# Patient Record
Sex: FEMALE | Race: WHITE | NOT HISPANIC OR LATINO | Employment: FULL TIME | URBAN - METROPOLITAN AREA
[De-identification: names, ages, dates, MRNs, and addresses within clinical notes are randomized per-mention and may not be internally consistent; named-entity substitution may affect disease eponyms.]

---

## 2018-01-03 ENCOUNTER — CONVERSION ENCOUNTER (OUTPATIENT)
Dept: INTERNAL MEDICINE | Facility: CLINIC | Age: 28
End: 2018-01-03

## 2018-01-03 ENCOUNTER — OFFICE VISIT CONVERTED (OUTPATIENT)
Dept: INTERNAL MEDICINE | Facility: CLINIC | Age: 28
End: 2018-01-03
Attending: INTERNAL MEDICINE

## 2018-03-07 ENCOUNTER — OFFICE VISIT CONVERTED (OUTPATIENT)
Dept: INTERNAL MEDICINE | Facility: CLINIC | Age: 28
End: 2018-03-07
Attending: INTERNAL MEDICINE

## 2018-04-13 ENCOUNTER — CONVERSION ENCOUNTER (OUTPATIENT)
Dept: INTERNAL MEDICINE | Facility: CLINIC | Age: 28
End: 2018-04-13

## 2018-04-13 ENCOUNTER — OFFICE VISIT CONVERTED (OUTPATIENT)
Dept: INTERNAL MEDICINE | Facility: CLINIC | Age: 28
End: 2018-04-13
Attending: INTERNAL MEDICINE

## 2018-05-25 ENCOUNTER — OFFICE VISIT CONVERTED (OUTPATIENT)
Dept: INTERNAL MEDICINE | Facility: CLINIC | Age: 28
End: 2018-05-25
Attending: INTERNAL MEDICINE

## 2018-05-25 ENCOUNTER — CONVERSION ENCOUNTER (OUTPATIENT)
Dept: INTERNAL MEDICINE | Facility: CLINIC | Age: 28
End: 2018-05-25

## 2018-07-03 ENCOUNTER — OFFICE VISIT CONVERTED (OUTPATIENT)
Dept: INTERNAL MEDICINE | Facility: CLINIC | Age: 28
End: 2018-07-03
Attending: INTERNAL MEDICINE

## 2018-07-03 ENCOUNTER — CONVERSION ENCOUNTER (OUTPATIENT)
Dept: INTERNAL MEDICINE | Facility: CLINIC | Age: 28
End: 2018-07-03

## 2018-07-19 ENCOUNTER — OFFICE VISIT CONVERTED (OUTPATIENT)
Dept: INTERNAL MEDICINE | Facility: CLINIC | Age: 28
End: 2018-07-19
Attending: NURSE PRACTITIONER

## 2018-08-14 ENCOUNTER — CONVERSION ENCOUNTER (OUTPATIENT)
Dept: INTERNAL MEDICINE | Facility: CLINIC | Age: 28
End: 2018-08-14

## 2018-08-14 ENCOUNTER — OFFICE VISIT CONVERTED (OUTPATIENT)
Dept: INTERNAL MEDICINE | Facility: CLINIC | Age: 28
End: 2018-08-14
Attending: INTERNAL MEDICINE

## 2018-10-26 ENCOUNTER — OFFICE VISIT CONVERTED (OUTPATIENT)
Dept: INTERNAL MEDICINE | Facility: CLINIC | Age: 28
End: 2018-10-26
Attending: INTERNAL MEDICINE

## 2019-01-29 ENCOUNTER — HOSPITAL ENCOUNTER (OUTPATIENT)
Dept: OTHER | Facility: HOSPITAL | Age: 29
Discharge: HOME | End: 2019-01-29
Attending: INTERNAL MEDICINE

## 2019-01-29 ENCOUNTER — OFFICE VISIT CONVERTED (OUTPATIENT)
Dept: INTERNAL MEDICINE | Facility: CLINIC | Age: 29
End: 2019-01-29
Attending: INTERNAL MEDICINE

## 2019-01-29 ENCOUNTER — CONVERSION ENCOUNTER (OUTPATIENT)
Dept: INTERNAL MEDICINE | Facility: CLINIC | Age: 29
End: 2019-01-29

## 2019-01-29 LAB
ALBUMIN SERPL-MCNC: 4.2 G/DL (ref 3.5–5)
ALBUMIN/GLOB SERPL: 1.6 {RATIO} (ref 1.4–2.6)
ALP SERPL-CCNC: 65 U/L (ref 42–98)
ALT SERPL-CCNC: 16 U/L (ref 10–40)
ANION GAP SERPL CALC-SCNC: 16 MMOL/L (ref 8–19)
AST SERPL-CCNC: 17 U/L (ref 15–50)
BASOPHILS # BLD AUTO: 0.09 10*3/UL (ref 0–0.2)
BASOPHILS NFR BLD AUTO: 1.21 % (ref 0–3)
BILIRUB SERPL-MCNC: 0.27 MG/DL (ref 0.2–1.3)
BUN SERPL-MCNC: 14 MG/DL (ref 5–25)
BUN/CREAT SERPL: 13 {RATIO} (ref 6–20)
CALCIUM SERPL-MCNC: 8.8 MG/DL (ref 8.7–10.4)
CHLORIDE SERPL-SCNC: 105 MMOL/L (ref 99–111)
CHOLEST SERPL-MCNC: 161 MG/DL (ref 107–200)
CHOLEST/HDLC SERPL: 3.7 {RATIO} (ref 3–6)
CONV CO2: 22 MMOL/L (ref 22–32)
CONV TOTAL PROTEIN: 6.9 G/DL (ref 6.3–8.2)
CREAT UR-MCNC: 1.07 MG/DL (ref 0.5–0.9)
EOSINOPHIL # BLD AUTO: 0.09 10*3/UL (ref 0–0.7)
EOSINOPHIL # BLD AUTO: 1.16 % (ref 0–7)
ERYTHROCYTE [DISTWIDTH] IN BLOOD BY AUTOMATED COUNT: 12.4 % (ref 11.5–14.5)
GFR SERPLBLD BASED ON 1.73 SQ M-ARVRAT: >60 ML/MIN/{1.73_M2}
GLOBULIN UR ELPH-MCNC: 2.7 G/DL (ref 2–3.5)
GLUCOSE SERPL-MCNC: 90 MG/DL (ref 65–99)
HBA1C MFR BLD: 12.4 G/DL (ref 12–16)
HCT VFR BLD AUTO: 35.5 % (ref 37–47)
HDLC SERPL-MCNC: 44 MG/DL (ref 40–60)
LDLC SERPL CALC-MCNC: 100 MG/DL (ref 70–100)
LYMPHOCYTES # BLD AUTO: 2.83 10*3/UL (ref 1–5)
MCH RBC QN AUTO: 28.8 PG (ref 27–31)
MCHC RBC AUTO-ENTMCNC: 35 G/DL (ref 33–37)
MCV RBC AUTO: 82.4 FL (ref 81–99)
MONOCYTES # BLD AUTO: 0.54 10*3/UL (ref 0.2–1.2)
MONOCYTES NFR BLD AUTO: 7.35 % (ref 3–10)
NEUTROPHILS # BLD AUTO: 3.76 10*3/UL (ref 2–8)
NEUTROPHILS NFR BLD AUTO: 51.5 % (ref 30–85)
NRBC BLD AUTO-RTO: 0 % (ref 0–0.01)
OSMOLALITY SERPL CALC.SUM OF ELEC: 288 MOSM/KG (ref 273–304)
PLATELET # BLD AUTO: 292 10*3/UL (ref 130–400)
PMV BLD AUTO: 7 FL (ref 7.4–10.4)
POTASSIUM SERPL-SCNC: 4.4 MMOL/L (ref 3.5–5.3)
RBC # BLD AUTO: 4.3 10*6/UL (ref 4.2–5.4)
SODIUM SERPL-SCNC: 139 MMOL/L (ref 135–147)
TRIGL SERPL-MCNC: 87 MG/DL (ref 40–150)
TSH SERPL-ACNC: 0.73 M[IU]/L (ref 0.27–4.2)
VARIANT LYMPHS NFR BLD MANUAL: 38.8 % (ref 20–45)
VLDLC SERPL-MCNC: 17 MG/DL (ref 5–37)
WBC # BLD AUTO: 7.31 10*3/UL (ref 4.8–10.8)

## 2019-03-26 ENCOUNTER — OFFICE VISIT CONVERTED (OUTPATIENT)
Dept: INTERNAL MEDICINE | Facility: CLINIC | Age: 29
End: 2019-03-26
Attending: INTERNAL MEDICINE

## 2019-03-26 ENCOUNTER — HOSPITAL ENCOUNTER (OUTPATIENT)
Dept: OTHER | Facility: HOSPITAL | Age: 29
Discharge: HOME | End: 2019-03-26
Attending: INTERNAL MEDICINE

## 2019-03-28 LAB
CONV LAST MENSTURAL PERIOD: NORMAL
SPECIMEN SOURCE: NORMAL
SPECIMEN SOURCE: NORMAL
THIN PREP CVX: NORMAL

## 2019-05-07 ENCOUNTER — OFFICE VISIT CONVERTED (OUTPATIENT)
Dept: INTERNAL MEDICINE | Facility: CLINIC | Age: 29
End: 2019-05-07
Attending: INTERNAL MEDICINE

## 2019-07-18 ENCOUNTER — OFFICE VISIT CONVERTED (OUTPATIENT)
Dept: INTERNAL MEDICINE | Facility: CLINIC | Age: 29
End: 2019-07-18
Attending: INTERNAL MEDICINE

## 2019-09-03 ENCOUNTER — OFFICE VISIT CONVERTED (OUTPATIENT)
Dept: INTERNAL MEDICINE | Facility: CLINIC | Age: 29
End: 2019-09-03
Attending: INTERNAL MEDICINE

## 2019-10-18 ENCOUNTER — OFFICE VISIT CONVERTED (OUTPATIENT)
Dept: INTERNAL MEDICINE | Facility: CLINIC | Age: 29
End: 2019-10-18
Attending: INTERNAL MEDICINE

## 2020-01-16 ENCOUNTER — OFFICE VISIT CONVERTED (OUTPATIENT)
Dept: INTERNAL MEDICINE | Facility: CLINIC | Age: 30
End: 2020-01-16
Attending: INTERNAL MEDICINE

## 2020-01-24 ENCOUNTER — HOSPITAL ENCOUNTER (OUTPATIENT)
Dept: LAB | Facility: HOSPITAL | Age: 30
Discharge: HOME | End: 2020-01-24
Attending: INTERNAL MEDICINE

## 2020-01-24 LAB
ALBUMIN SERPL-MCNC: 4.5 G/DL (ref 3.5–5)
ALBUMIN/GLOB SERPL: 1.6 {RATIO} (ref 1.4–2.6)
ALP SERPL-CCNC: 62 U/L (ref 42–98)
ALT SERPL-CCNC: 16 U/L (ref 10–40)
ANION GAP SERPL CALC-SCNC: 21 MMOL/L (ref 8–19)
AST SERPL-CCNC: 17 U/L (ref 15–50)
BASOPHILS # BLD AUTO: 0.06 10*3/UL (ref 0–0.2)
BASOPHILS NFR BLD AUTO: 0.6 % (ref 0–3)
BILIRUB SERPL-MCNC: 0.17 MG/DL (ref 0.2–1.3)
BUN SERPL-MCNC: 17 MG/DL (ref 5–25)
BUN/CREAT SERPL: 16 {RATIO} (ref 6–20)
CALCIUM SERPL-MCNC: 9.5 MG/DL (ref 8.7–10.4)
CHLORIDE SERPL-SCNC: 106 MMOL/L (ref 99–111)
CONV ABS IMM GRAN: 0.03 10*3/UL (ref 0–0.2)
CONV CO2: 20 MMOL/L (ref 22–32)
CONV IMMATURE GRAN: 0.3 % (ref 0–1.8)
CONV TOTAL PROTEIN: 7.3 G/DL (ref 6.3–8.2)
CREAT UR-MCNC: 1.08 MG/DL (ref 0.5–0.9)
DEPRECATED RDW RBC AUTO: 42.2 FL (ref 36.4–46.3)
EOSINOPHIL # BLD AUTO: 0.09 10*3/UL (ref 0–0.7)
EOSINOPHIL # BLD AUTO: 0.9 % (ref 0–7)
ERYTHROCYTE [DISTWIDTH] IN BLOOD BY AUTOMATED COUNT: 13.7 % (ref 11.7–14.4)
GFR SERPLBLD BASED ON 1.73 SQ M-ARVRAT: >60 ML/MIN/{1.73_M2}
GLOBULIN UR ELPH-MCNC: 2.8 G/DL (ref 2–3.5)
GLUCOSE SERPL-MCNC: 95 MG/DL (ref 65–99)
HCT VFR BLD AUTO: 38.3 % (ref 37–47)
HGB BLD-MCNC: 12 G/DL (ref 12–16)
LYMPHOCYTES # BLD AUTO: 2.25 10*3/UL (ref 1–5)
LYMPHOCYTES NFR BLD AUTO: 23.5 % (ref 20–45)
MCH RBC QN AUTO: 26.4 PG (ref 27–31)
MCHC RBC AUTO-ENTMCNC: 31.3 G/DL (ref 33–37)
MCV RBC AUTO: 84.4 FL (ref 81–99)
MONOCYTES # BLD AUTO: 0.56 10*3/UL (ref 0.2–1.2)
MONOCYTES NFR BLD AUTO: 5.8 % (ref 3–10)
NEUTROPHILS # BLD AUTO: 6.6 10*3/UL (ref 2–8)
NEUTROPHILS NFR BLD AUTO: 68.9 % (ref 30–85)
NRBC CBCN: 0 % (ref 0–0.7)
OSMOLALITY SERPL CALC.SUM OF ELEC: 295 MOSM/KG (ref 273–304)
PLATELET # BLD AUTO: 321 10*3/UL (ref 130–400)
PMV BLD AUTO: 9.8 FL (ref 9.4–12.3)
POTASSIUM SERPL-SCNC: 4.5 MMOL/L (ref 3.5–5.3)
RBC # BLD AUTO: 4.54 10*6/UL (ref 4.2–5.4)
SODIUM SERPL-SCNC: 142 MMOL/L (ref 135–147)
T4 FREE SERPL-MCNC: 1.2 NG/DL (ref 0.9–1.8)
TSH SERPL-ACNC: 0.36 M[IU]/L (ref 0.27–4.2)
WBC # BLD AUTO: 9.59 10*3/UL (ref 4.8–10.8)

## 2020-01-29 LAB
CONV ANTI GALACTOSE ALPHA 1,3 IGE: 0.27 KU/L
IGE SERPL-ACNC: 4 K[IU]/ML (ref 0–24)

## 2020-03-02 ENCOUNTER — OFFICE VISIT CONVERTED (OUTPATIENT)
Dept: INTERNAL MEDICINE | Facility: CLINIC | Age: 30
End: 2020-03-02
Attending: INTERNAL MEDICINE

## 2020-03-02 ENCOUNTER — CONVERSION ENCOUNTER (OUTPATIENT)
Dept: INTERNAL MEDICINE | Facility: CLINIC | Age: 30
End: 2020-03-02

## 2020-03-13 ENCOUNTER — HOSPITAL ENCOUNTER (OUTPATIENT)
Dept: URGENT CARE | Facility: CLINIC | Age: 30
Discharge: HOME | End: 2020-03-13
Attending: FAMILY MEDICINE

## 2020-05-01 ENCOUNTER — TELEMEDICINE CONVERTED (OUTPATIENT)
Dept: INTERNAL MEDICINE | Facility: CLINIC | Age: 30
End: 2020-05-01
Attending: INTERNAL MEDICINE

## 2020-06-09 ENCOUNTER — TELEMEDICINE CONVERTED (OUTPATIENT)
Dept: INTERNAL MEDICINE | Facility: CLINIC | Age: 30
End: 2020-06-09
Attending: INTERNAL MEDICINE

## 2020-07-09 ENCOUNTER — TELEMEDICINE CONVERTED (OUTPATIENT)
Dept: INTERNAL MEDICINE | Facility: CLINIC | Age: 30
End: 2020-07-09
Attending: NURSE PRACTITIONER

## 2020-08-10 ENCOUNTER — TELEMEDICINE CONVERTED (OUTPATIENT)
Dept: INTERNAL MEDICINE | Facility: CLINIC | Age: 30
End: 2020-08-10
Attending: NURSE PRACTITIONER

## 2020-11-06 ENCOUNTER — TELEMEDICINE CONVERTED (OUTPATIENT)
Dept: INTERNAL MEDICINE | Facility: CLINIC | Age: 30
End: 2020-11-06
Attending: INTERNAL MEDICINE

## 2020-11-17 ENCOUNTER — OFFICE VISIT CONVERTED (OUTPATIENT)
Dept: PSYCHIATRY | Facility: CLINIC | Age: 30
End: 2020-11-17
Attending: STUDENT IN AN ORGANIZED HEALTH CARE EDUCATION/TRAINING PROGRAM

## 2020-12-17 ENCOUNTER — TELEMEDICINE CONVERTED (OUTPATIENT)
Dept: PSYCHIATRY | Facility: CLINIC | Age: 30
End: 2020-12-17
Attending: STUDENT IN AN ORGANIZED HEALTH CARE EDUCATION/TRAINING PROGRAM

## 2021-01-19 ENCOUNTER — TELEMEDICINE CONVERTED (OUTPATIENT)
Dept: PSYCHIATRY | Facility: CLINIC | Age: 31
End: 2021-01-19
Attending: STUDENT IN AN ORGANIZED HEALTH CARE EDUCATION/TRAINING PROGRAM

## 2021-02-17 ENCOUNTER — TELEMEDICINE CONVERTED (OUTPATIENT)
Dept: INTERNAL MEDICINE | Facility: CLINIC | Age: 31
End: 2021-02-17
Attending: INTERNAL MEDICINE

## 2021-02-25 ENCOUNTER — TELEMEDICINE CONVERTED (OUTPATIENT)
Dept: PSYCHIATRY | Facility: CLINIC | Age: 31
End: 2021-02-25
Attending: STUDENT IN AN ORGANIZED HEALTH CARE EDUCATION/TRAINING PROGRAM

## 2021-05-10 NOTE — H&P
"   History and Physical      Patient Name: Carina Sanchez   Patient ID: [id number]   Sex: Female   YOB: 1990    Primary Care Provider: Amber Byrne MD   Referring Provider: Amber Byrne MD    Visit Date: November 17, 2020    Provider: Mihaela Hull MD   Location: [facility]   Location Address: [address] [place] [address]  [id number]   Location Phone: [phone]          Chief Complaint     Pt presents with anxiety    \"I have been battling with depression since I can remember.\"       History Of Present Illness  Carina Sanchez is a 30 year old /White female who presents to the office today referred by Amber Byrne MD.   Chart 11/17: Seen November 6. Feels like she is on a roller coaster. On Trintellix 20 mg a day, highest dose. BMI is 44. March: Creatinine 1.08. Otherwise utd&wnl. No EKG or head imaging. PHQ 9 November 17 of 16, jaclyn 7 is 14, almost severe.   Patient reports the last year has been difficult. She states she has been battling depression \"since I can remember.\" Regarding depressive symptoms, endorses anhedonia, as she used to enjoy listening to music and watching TV. Also endorses sleeping in excess, especially on the weekends; poor energy, binge eating, and poor concentration. Regarding anxiety symptoms, patient endorses feeling on edge, uncontrolled worrying, irritability.   Patient is extremely organized and brought a notebook full of lists regarding such things as her triggers, medications, etc. She also brought along a journal.   Patient wonders if she has borderline personality disorder. She feels the description \"fits me exactly.\"   Patient has access to weapons, not all of them locked away. Psychiatric review of systems is positive for anxiety and depression, negative for yin and psychosis. She denies SI HI AVH. On Trintellix for about the last year. On 20 mg of this medication for the last 2 months.   Past Psychiatric " "History:  Began Psychiatric Treatment: For about the last 2 years   Dx: Depression and anxiety   Psychiatrist: Primary care provider   Therapist: Tried therapy once via Zoom, and did not like it. Some willingness to try again, if she can meet face-to-face with her therapist for the initial visit.   Admissions History: Denies   Medication Trials: Trintellix, Zoloft, Cymbalta, Lexapro, Concerta, Wellbutrin. None of these medications have helped.   Self-Harm: Patient used to cut her shoulders bilaterally in high school to \"feel something.\" Patient also reports it \"gave me control over something.\"   Suicide Attempts: Denies   Substance Abuse History:  Types: Smokes 6 cigarettes a day, no desire to quit. Vapes cannabis through 1 pad a month. Social alcohol. Denies all else, including illicit.   Social History:  Marital Status: Single   Employed: Yes   Employer: [employer]   Kids: Denies   House: Lives in her mom and stepwinston's house    Hx: Denies   Family History:  Suicide Attempts: Paternal grandfather shot himself, her mom's brother shot himself, and her maternal cousin shot himself 4 years ago. This latter is one of the reasons why the patient \"began her spiral.\"   Suicide Completions: All of the above were completed   Substance Use: Addiction is on both sides of the family   Psychiatric Conditions: \"A lot\" of psychiatry issues. States one of her family members is diagnosed with bipolar.   Developmental History:  Born: [place]   Siblings: Deferred   Childhood: No abuse. Raised by her grandparents until she was 7 years old, then her mother took over taking care of her.   High School: Completed   College: Some       Past Medical History  Disease Name Date Onset Notes   Allergic rhinitis --  --    Anxiety --  --    Arthritis --  --    Bipolar Disorder --  --    GERD (gastroesophageal reflux disease) 04/13/2018 well controlled cont current meds   HTN (hypertension) --  --    Moderate episode of recurrent major " depressive disorder 04/13/2018 Will increase lexapro to 10 mg. Will also increase wellbutrin to 150 mg BID, discussed risk of serotonin syndrome and signs and symptoms to look for with using lexapro and wellbutrin together. Discussed seeing a counselor to talk through her underlying social stressors, patient agrees to do so. Told patient that we will consider a referral to psychiatry at a later date if we are unable to get her depression under good control. Educated the patient on the importance of finding a career and goals that are beneficial to her mental health.          Past Surgical History  Injection of keloid; [place] Tooth Extraction         Medication List  Name Date Started Instructions   Allegra Allergy 180 mg oral tablet 11/06/2020 take 1 tablet (180 mg) by oral route once daily for 90 days   CeraVe topical cream 01/16/2020 apply to affected area(s) by topical route 2 times a day   Depo-Provera 150 mg/mL intramuscular suspension 09/08/2020 inject 1 milliliter (150 mg) by intramuscular route every 3 months   Flonase Allergy Relief 50 mcg/actuation nasal spray,suspension 11/06/2020 spray 1 - 2 sprays (50 - 100 mcg) in each nostril by intranasal route once daily as needed   lisinopril 10 mg oral tablet 11/06/2020 TAKE 1 TABLET BY MOUTH ONCE daily   naproxen 500 mg oral tablet 11/06/2020 take 1 tablet (500 mg) by oral route 2 times per day with food   omeprazole 40 mg oral capsule,delayed release(DR/EC) 11/06/2020 take 1 capsule (40 mg) by oral route once daily before a meal for 90 days   Tazorac 0.05 % topical gel 11/06/2020 apply to the affected area(s) by topical route once daily   Trintellix 20 mg oral tablet 11/06/2020 TAKE 1 TABLET BY MOUTH EVERY DAY AT the same time each day.         Allergy List  Allergen Name Date Reaction Notes   Keflex --  Hives --          Reproductive History  Menstrual   Last Menstrual Period: 02/07/2015   Pregnancy Summary   Total Pregnancies: 0 Full Term: 0 Premature: 0   Ab  "Induced: 0 Ab Spontaneous: 0 Ectopics: 0   Multiples: 0 Livin         Social History  Finding Status Start/Stop Quantity Notes   Alcohol Current some day --/-- --  --    Tobacco Former --/-- 1 PPD Hx 9 years of smoking         Immunizations  Name Date Admin   Influenza 10/18/2019   Influenza 2018         Review of Systems  · Constitutional  o Admits  o : fatigue  o Denies  o : night sweats  · Eyes  o Denies  o : double vision, blurred vision  · HENT  o Denies  o : vertigo, recent head injury  · Cardiovascular  o Denies  o : chest pain, irregular heart beats  · Respiratory  o Denies  o : shortness of breath, productive cough  · Gastrointestinal  o Denies  o : nausea, vomiting  · Genitourinary  o Denies  o : dysuria, urinary retention  · Integument  o Denies  o : hair growth change, new skin lesions  · Neurologic  o Denies  o : altered mental status, seizures  · Musculoskeletal  o Denies  o : joint swelling, limitation of motion  · Endocrine  o Denies  o : cold intolerance, heat intolerance  · Psychiatric  o Admits  o : anxiety, depression, yin  o Denies  o : post traumatic stress disorder, obsessive compulsive disorder, psychosis  · Allergic-Immunologic  o Denies  o : frequent illnesses      Vitals  Date Time BP Position Site L\R Cuff Size HR RR TEMP (F) WT  HT  BMI kg/m2 BSA m2 O2 Sat FR L/min FiO2        2020 02:46 /91 Sitting    102 - R 20 96.6 265lbs 4oz 5'  6\" 42.81 2.37 99 %  21%          Physical Examination  · Mental Status Exam  o Appearance  o : good eye contact, normal street clothes, groomed, wearing a mask  o Behavior  o : pleasant and cooperative  o Motor  o : no abnormal  o Speech  o : normal rhythm, rate, volume, tone, not hyperverbal, not pressured, normal prosidy  o Mood  o : \"I am broken\"  o Affect  o : Euthymic, then tearful at times  o Thought Content  o : negative suicidal ideations, negative homicidal ideations, negative obsessions  o Perceptions  o : negative auditory " hallucinations, negative visual hallucinations  o Thought Process  o : Circumstantial  o Insight/Judgement  o : fair/fair  o Cognition  o : grossly intact  o Attention  o : intact          Assessment  · Screening for depression     V79.0/Z13.31  · Tobacco abuse counseling       Tobacco abuse counseling     V65.42/Z71.6  · Depressive Disorder     311/F32.9  · Anxiety     300.02/F41.1       Presentation most consistent with cluster B personality disorder, very likely borderline.  Also depressive disorder unspecified, anxiety disorder unspecified.    Would augment vortioxetine with Abilify, low-dose.  Psychotherapy is the mainstay of treatment for personality disorders, and it is recommended; referral made.  Patient to return in 1 month.       Plan  · Orders  o ACO-18: Positive screen for clinical depression using a standardized tool and a follow-up plan documented () - V79.0/Z13.31 - 11/17/2020  o Smoking and tobacco cessation counseling visit for the asymptomatic patient; intermediate, greater than 3 minutes, up to 10 minutes [facility] ([id number]) - V65.42/Z71.6 - 11/17/2020  o PSYCHOTHERAPY CONSULTATION (TRANGYTH) - 300.02/F41.1, V79.0/Z13.31, 311/F32.9, V65.42/Z71.6 - 11/17/2020  o ACO-39: Current medications updated and reviewed (, 1159F) - - 11/17/2020  · Medications  o Abilify 2 mg oral tablet   SIG: take 1 tablet (2 mg) by oral route once daily for 30 days   DISP: (30) Tablet with 1 refills  Prescribed on 11/17/2020     o Medications have been Reconciled  o Transition of Care or Provider Policy  · Instructions  o Tobacco and smoking cessation counseling for more than 3 minutes was completed.  o Risk Assessment: Risk of self-harm acutely is moderate to high. Risk factors include depressive disorder, anxiety disorder, personality disorder, access to weapons, history of self-harm, family history of suicide attempts, some AODA. Protective factors include no present SI, no history of suicide attempts, healthcare  seeking, future orientation, willingness to engage in care, psychologically minded. Risk of self-harm chronically is also moderate to high, and could be further elevated in the event of treatment noncompliance and/or AODA.  o Safety: No acute safety concerns.  o Medications: Continue Trintellix 20 mg p.o. daily. START Abilify 2 mg p.o. daily to augment Trintellix and target depression, anxiety, decreased energy. Risks, benefits, alternatives discussed with patient including increased energy, exacerbation of irritability, akathisia, GI upset, orthostatic hypotension. After discussion of these risks and benefits, the patient voiced understanding and agreed to proceed.  o Therapy: Recommended; referral made. Patient desires to have at least the initial visit in person, and during the pandemic she may have difficulty finding a clinic that will accommodate this wish.  o Follow Up: 1 month.  · Disposition  o Follow Up in 1 month.  · Correspondence  o Behavioral Health Letter to Referring MD (Amber Byrne MD) - 11/17/2020            Electronically Signed by: Mihaela Hull MD -Author on November 17, 2020 05:59:09 PM

## 2021-05-13 NOTE — PROGRESS NOTES
"   Progress Note      Patient Name: Carina Sanchez   Patient ID: [id number]   Sex: Female   YOB: 1990    Primary Care Provider: Amber Byrne MD    Visit Date: July 9, 2020    Provider: MARNIE Del Real   Location: [facility]   Location Address: [address] [place] [address], [facility] [address]  [place], [place]  [id number]   Location Phone: [phone]          Chief Complaint  · \"I am following up on vyvanse\"      History Of Present Illness  Video Conferencing Visit  Carina Sanchez is a 30 year old /White female who is presenting for evaluation via video conferencing via Zoom. Verbal consent obtained before beginning visit.   The following staff were present during this visit: MARNIE Booth   Carina Sanchez is a 30 year old /White female who presents for evaluation and treatment of:      Informed patient that as visit is being performed as a telehealth visit there will be no opportunity to obtain vital signs or perform physical exam.  Due to this there is unfortunately a possibility that things may be missed that would typically be noticed during a traditionally visit.  Patient is aware of this possibility and agrees to proceed with telehealth.  Patient states there is no other person present for this phone call    Patient is doing a 1 month follow-up related to her new medication.  Patient was started on Vyvanse 40 mg.  Patient has been taking the 20 mg for the first month and is now ready to increase to the 40 mg as prescribed.  Patient states that she can tell an improvement in her concentration but thinks that the 40 mg will work better.    Denies palpitations or appetite concerns.  Has lost about 10lbs working on her diet and drinking more water. Feels she isn't eating out of boredom.    Patient voices no other concerns at this time.       Past Medical History  Disease Name Date Onset Notes   Allergic rhinitis --  --    Anxiety --  --    Arthritis --  --    Bipolar " Disorder --  --    GERD (gastroesophageal reflux disease) 04/13/2018 well controlled cont current meds   HTN (hypertension) --  --    Moderate episode of recurrent major depressive disorder 04/13/2018 Will increase lexapro to 10 mg. Will also increase wellbutrin to 150 mg BID, discussed risk of serotonin syndrome and signs and symptoms to look for with using lexapro and wellbutrin together. Discussed seeing a counselor to talk through her underlying social stressors, patient agrees to do so. Told patient that we will consider a referral to psychiatry at a later date if we are unable to get her depression under good control. Educated the patient on the importance of finding a career and goals that are beneficial to her mental health.          Past Surgical History  Procedure Name Date Notes   Injection of keloid 2000 Keloid removal both ears   [place] Tooth Extraction --  --          Medication List  Name Date Started Instructions   Allegra Allergy 180 mg oral tablet 10/18/2019 take 1 tablet (180 mg) by oral route once daily for 90 days   CeraVe topical cream 01/16/2020 apply to affected area(s) by topical route 2 times a day   Flonase Allergy Relief 50 mcg/actuation nasal spray,suspension 10/18/2019 spray 1 - 2 sprays (50 - 100 mcg) in each nostril by intranasal route once daily as needed   lisinopril 10 mg oral tablet 01/16/2020 take 1 tablet (10 mg) by oral route once daily for 90 days   medroxyprogesterone 150 mg/mL intramuscular syringe 06/08/2020 inject 1 milliliter (150 mg) by intramuscular route every 3 months   naproxen 500 mg oral tablet 10/18/2019 take 1 tablet (500 mg) by oral route 2 times per day with food   omeprazole 40 mg oral capsule,delayed release(DR/EC) 04/28/2020 take 1 capsule (40 mg) by oral route once daily before a meal for 90 days   Tazorac 0.05 % topical gel 01/16/2020 apply to the affected area(s) by topical route once daily   Trintellix 10 mg oral tablet 03/31/2020 take 1 tablet (10 mg)  by oral route once daily at the same time each day   Vyvanse 40 mg oral capsule 2020 take 1 capsule (40 mg) by oral route once daily in the morning for 30 days         Allergy List  Allergen Name Date Reaction Notes   Keflex --  Hives --        Allergies Reconciled  Reproductive History  Menstrual   Last Menstrual Period: 2015   Pregnancy Summary   Total Pregnancies: 0 Full Term: 0 Premature: 0   Ab Induced: 0 Ab Spontaneous: 0 Ectopics: 0   Multiples: 0 Livin         Social History  Finding Status Start/Stop Quantity Notes   Alcohol Current some day --/-- --  --    Tobacco Former --/-- 1 PPD Hx 9 years of smoking         Immunizations  NameDate Admin Mfg Trade Name Lot Number Route Inj VIS Given VIS Publication   [id number]/ [employer] Fluzone Quadrivalent [id number] IM RD 10/18/2019    Comments: pt tolerated well and left office in stable condition, DESIREE Norman   Gsotonvsq40/07/2018 [facility] Fluzone Quadrivalent [id number] IM RD 2018   Comments: tolerated well         Review of Systems  · Constitutional  o Admits  o : weight loss  o Denies  o : fever, fatigue, weight gain  · Cardiovascular  o Denies  o : lower extremity edema, claudication, chest pressure, palpitations  · Respiratory  o Denies  o : shortness of breath, wheezing, frequent cough, hemoptysis, dyspnea on exertion  · Gastrointestinal  o Denies  o : nausea, vomiting, diarrhea, constipation, abdominal pain  · Psychiatric  o Admits  o : decreased concentration, irritability  o Denies  o : anxiety, mood changes, memory changes, SI/HI      Physical Examination     Gen: well-nourished, no acute distress  HENT: atraumatic, normocephalic  Eyes: extraocular movements intact, no scleral icterus  Lung: breathing comfortably, no cough  Skin: no visible rash, no lesions  Neuro: grossly oriented to person, place, and time. no facial droop   Psych: normal mood and affect             Assessment  · Moderate episode of recurrent major depressive  disorder     296.32/F33.1  Will increase lexapro to 10 mg. Will also increase wellbutrin to 150 mg BID, discussed risk of serotonin syndrome and signs and symptoms to look for with using lexapro and wellbutrin together. Discussed seeing a counselor to talk through her underlying social stressors, patient agrees to do so. Told patient that we will consider a referral to psychiatry at a later date if we are unable to get her depression under good control. Educated the patient on the importance of finding a career and goals that are beneficial to her mental health.   · Anxiety     300.02/F41.1  · Concentration deficit     799.51/R41.840  Patient is doing well with Vyvanse 20 mg and plans to increase the 40 mg at this time. We will follow-up in 1 month.    Problems Reconciled  Plan  · Orders  o ACO-39: Current medications updated and reviewed () - - 07/09/2020  · Medications  o Medications have been Reconciled  o Transition of Care or Provider Policy  · Instructions  o Patient was educated/instructed on their diagnosis, treatment and medications prior to discharge from the clinic today.  · Disposition  o Call or Return if symptoms worsen or persist.  o Meds sent to pharmacy  o 1 month follow up            Electronically Signed by: Yakelin Mcdonnell, APRN -Author on July 9, 2020 12:38:00 PM

## 2021-05-13 NOTE — PROGRESS NOTES
"   Progress Note      Patient Name: Carina Sanchez   Patient ID: [id number]   Sex: Female   YOB: 1990    Primary Care Provider: Amber Byrne MD    Visit Date: June 9, 2020    Provider: Amber Byrne MD   Location: [facility]   Location Address: [address] [place] [address], [facility] [address]  [place], [place]  [id number]   Location Phone: [phone]          Chief Complaint  · \"following up on Concerta\"  · \"I want to talk to her about phentermine\"      History Of Present Illness  Video Conferencing Visit  Carina Sanchez is a 30 year old /White female who is presenting for evaluation via video conferencing via zoom. Verbal consent obtained before beginning visit.   The following staff were present during this visit: none   Carina Sanchez is a 30 year old /White female who presents for evaluation and treatment of:      doing well with anxiety  cont current meds  having some trouble with concentration    she didn't like the medicine we were using for concentration  states that she just didn't feel like herself on it    she is still frustrated with her weight  she would like to try phentermine for weight loss       Past Medical History  Disease Name Date Onset Notes   Allergic rhinitis --  --    Anxiety --  --    Arthritis --  --    Bipolar Disorder --  --    GERD (gastroesophageal reflux disease) 04/13/2018 well controlled cont current meds   HTN (hypertension) --  --    Moderate episode of recurrent major depressive disorder 04/13/2018 Will increase lexapro to 10 mg. Will also increase wellbutrin to 150 mg BID, discussed risk of serotonin syndrome and signs and symptoms to look for with using lexapro and wellbutrin together. Discussed seeing a counselor to talk through her underlying social stressors, patient agrees to do so. Told patient that we will consider a referral to psychiatry at a later date if we are unable to get her depression under good control. Educated the " patient on the importance of finding a career and goals that are beneficial to her mental health.          Past Surgical History  Procedure Name Date Notes   Injection of keloid 2000 Keloid removal both ears   [place] Tooth Extraction --  --          Medication List  Name Date Started Instructions   Allegra Allergy 180 mg oral tablet 10/18/2019 take 1 tablet (180 mg) by oral route once daily for 90 days   CeraVe topical cream 2020 apply to affected area(s) by topical route 2 times a day   Flonase Allergy Relief 50 mcg/actuation nasal spray,suspension 10/18/2019 spray 1 - 2 sprays (50 - 100 mcg) in each nostril by intranasal route once daily as needed   lisinopril 10 mg oral tablet 2020 take 1 tablet (10 mg) by oral route once daily for 90 days   medroxyprogesterone 150 mg/mL intramuscular syringe 2020 inject 1 milliliter (150 mg) by intramuscular route every 3 months   naproxen 500 mg oral tablet 10/18/2019 take 1 tablet (500 mg) by oral route 2 times per day with food   omeprazole 40 mg oral capsule,delayed release(DR/EC) 2020 take 1 capsule (40 mg) by oral route once daily before a meal for 90 days   Tazorac 0.05 % topical gel 2020 apply to the affected area(s) by topical route once daily   Trintellix 10 mg oral tablet 2020 take 1 tablet (10 mg) by oral route once daily at the same time each day         Allergy List  Allergen Name Date Reaction Notes   Keflex --  Hives --        Allergies Reconciled  Reproductive History  Menstrual   Last Menstrual Period: 2015   Pregnancy Summary   Total Pregnancies: 0 Full Term: 0 Premature: 0   Ab Induced: 0 Ab Spontaneous: 0 Ectopics: 0   Multiples: 0 Livin         Social History  Finding Status Start/Stop Quantity Notes   Alcohol Current some day --/-- --  --    Tobacco Former --/-- 1 PPD Hx 9 years of smoking         Immunizations  NameDate Admin Mfg Trade Name Lot Number Route Inj VIS Given VIS Publication   [id number]/  [facility] Fluzone Quadrivalent [id number] IM RD 10/18/2019    Comments: pt tolerated well and left office in stable condition, DESIREE Norman   [id number]/07/2018 [employer] Fluzone Quadrivalent [id number] IM RD 11/07/2018 08/19/2014   Comments: tolerated well         Review of Systems  · Constitutional  o Denies  o : fever, fatigue, weight loss, weight gain  · Cardiovascular  o Denies  o : lower extremity edema, claudication, chest pressure, palpitations  · Respiratory  o Denies  o : shortness of breath, wheezing, frequent cough, hemoptysis, dyspnea on exertion  · Gastrointestinal  o Denies  o : nausea, vomiting, diarrhea, constipation, abdominal pain      Physical Examination     Gen: well-nourished, no acute distress  HENT: atraumatic, normocephalic  Eyes: extraocular movements intact, no scleral icterus  Lung: breathing comfortably, no cough  Skin: no visible rash, no lesions  Neuro: grossly oriented to person, place, and time. no facial droop   Psych: normal mood and affect             Assessment  · Anxiety     300.02/F41.1  · Obesity     278.00/E66.9  · Concentration deficit     799.51/R41.840       will try Vyvanse as it will help with concentration and could possibly help with weight too    discussed to monitor for chest pain, increased bp, palpitations etc     Problems Reconciled  Plan  · Orders  o ACO-39: Current medications updated and reviewed () - - 06/09/2020  · Medications  o Vyvanse 20 mg oral capsule   SIG: one tab po daily x 2 weeks then can increase to 2 tab po daily if needed   DISP: (45) capsules with 0 refills  Prescribed on 06/09/2020     o Medications have been Reconciled  o Transition of Care or Provider Policy  · Instructions  o Patient was educated/instructed on their diagnosis, treatment and medications prior to discharge from the clinic today.            Electronically Signed by: Amber Byrne MD -Author on June 16, 2020 11:44:04 PM

## 2021-05-13 NOTE — PROGRESS NOTES
"   Progress Note      Patient Name: Carina Sanchez   Patient ID: [id number]   Sex: Female   YOB: 1990    Primary Care Provider: Amber Byrne MD    Visit Date: August 10, 2020    Provider: MARNIE Del Real   Location: [facility]   Location Address: [address] [place] [address], [facility]  [place], [place]  [id number]   Location Phone: [phone]          Chief Complaint  · \"1 month follow up on my medications\"      History Of Present Illness  Video Conferencing Visit  Carina Sanchez is a 30 year old /White female who is presenting for evaluation via video conferencing via Zoom. Verbal consent obtained before beginning visit.   The following staff were present during this visit: MARNIE Booth   Carina Sanchez is a 30 year old /White female who presents for evaluation and treatment of:      Informed patient that as visit is being performed as a telehealth visit there will be no opportunity to obtain vital signs or perform physical exam.  Due to this there is unfortunately a possibility that things may be missed that would typically be noticed during a traditionally visit.  Patient is aware of this possibility and agrees to proceed with telehealth.  Patient states there is no other person present for this phone call.    1 month follow-up    Patient is doing a 1 month follow-up related to medication change.  Patient had been on Vyvanse 20 mg and was ready to increase it 40 mg the last visit related to improvement of concentration.  Patient started the 40 mg did not feel like her concentration was much better and was not losing weight she had hoped.  Patient's son to discontinue the medication and is doing well without it.    Anxietypatient states that she continues to have anxiety with the Trintellix 10 mg but feels that it helps some and would like to consider increasing the medication.  Patient reports that with COVID-19 she does feel like the nights and weekends " can be lonely.  Denies suicidal or homicidal thoughts.    No other concerns.       Past Medical History  Disease Name Date Onset Notes   Allergic rhinitis --  --    Anxiety --  --    Arthritis --  --    Bipolar Disorder --  --    GERD (gastroesophageal reflux disease) 04/13/2018 well controlled cont current meds   HTN (hypertension) --  --    Moderate episode of recurrent major depressive disorder 04/13/2018 Will increase lexapro to 10 mg. Will also increase wellbutrin to 150 mg BID, discussed risk of serotonin syndrome and signs and symptoms to look for with using lexapro and wellbutrin together. Discussed seeing a counselor to talk through her underlying social stressors, patient agrees to do so. Told patient that we will consider a referral to psychiatry at a later date if we are unable to get her depression under good control. Educated the patient on the importance of finding a career and goals that are beneficial to her mental health.          Past Surgical History  Procedure Name Date Notes   Injection of keloid 2000 Keloid removal both ears   [place] Tooth Extraction --  --          Medication List  Name Date Started Instructions   Allegra Allergy 180 mg oral tablet 10/18/2019 take 1 tablet (180 mg) by oral route once daily for 90 days   CeraVe topical cream 01/16/2020 apply to affected area(s) by topical route 2 times a day   Flonase Allergy Relief 50 mcg/actuation nasal spray,suspension 10/18/2019 spray 1 - 2 sprays (50 - 100 mcg) in each nostril by intranasal route once daily as needed   lisinopril 10 mg oral tablet 01/16/2020 take 1 tablet (10 mg) by oral route once daily for 90 days   medroxyprogesterone 150 mg/mL intramuscular syringe 06/08/2020 inject 1 milliliter (150 mg) by intramuscular route every 3 months   naproxen 500 mg oral tablet 10/18/2019 take 1 tablet (500 mg) by oral route 2 times per day with food   omeprazole 40 mg oral capsule,delayed release(DR/EC) 04/28/2020 take 1 capsule (40 mg) by  oral route once daily before a meal for 90 days   Tazorac 0.05 % topical gel 2020 apply to the affected area(s) by topical route once daily   Trintellix 10 mg oral tablet 2020 take 1 tablet (10 mg) by oral route once daily at the same time each day         Allergy List  Allergen Name Date Reaction Notes   Keflex --  Hives --        Allergies Reconciled  Reproductive History  Menstrual   Last Menstrual Period: 2015   Pregnancy Summary   Total Pregnancies: 0 Full Term: 0 Premature: 0   Ab Induced: 0 Ab Spontaneous: 0 Ectopics: 0   Multiples: 0 Livin         Social History  Finding Status Start/Stop Quantity Notes   Alcohol Current some day --/-- --  --    Tobacco Former --/-- 1 PPD Hx 9 years of smoking         Immunizations  NameDate Admin Mfg Trade Name Lot Number Route Inj VIS Given VIS Publication   [id number]/ [facility] Fluzone Quadrivalent [id number] IM RD 10/18/2019    Comments: pt tolerated well and left office in stable condition, DESIREE Norman   Obnqizpnz39/07/2018 [facility] Fluzone Quadrivalent [id number] IM RD 2018   Comments: tolerated well         Review of Systems  · Constitutional  o Denies  o : fever, fatigue, weight loss, weight gain  · Cardiovascular  o Denies  o : lower extremity edema, claudication, chest pressure, palpitations  · Respiratory  o Denies  o : shortness of breath, wheezing, frequent cough, hemoptysis, dyspnea on exertion  · Gastrointestinal  o Denies  o : nausea, vomiting, diarrhea, constipation, abdominal pain  · Psychiatric  o Admits  o : anxiety  o Denies  o : depression, impulsive behaviors, suicidal ideation, homicidal ideation      Physical Examination     Gen: well-nourished, no acute distress  HENT: atraumatic, normocephalic  Eyes: extraocular movements intact, no scleral icterus  Lung: breathing comfortably, no cough  Skin: no visible rash, no lesions  Neuro: grossly oriented to person, place, and time. no facial droop   Psych: normal mood and  affect             Assessment  · Moderate episode of recurrent major depressive disorder     296.32/F33.1  Will increase Trintellix from 10 mg to 20 mg. Patient will follow-up in 6 weeks regarding her medication change. Encouraged behavioral health therapy at this time.  · Anxiety     300.02/F41.1  · HTN (hypertension)     401.9/I10    Problems Reconciled  Plan  · Orders  o ACO-39: Current medications updated and reviewed () - - 08/10/2020  · Medications  o Trintellix 20 mg oral tablet   SIG: take 1 tablet (20 mg) by oral route once daily at the same time each day   DISP: (30) tablets with 1 refills  Adjusted on 08/10/2020     o Medications have been Reconciled  o Transition of Care or Provider Policy  · Instructions  o Patient was educated/instructed on their diagnosis, treatment and medications prior to discharge from the clinic today.  o Call the office with any concerns or questions.  · Disposition  o Call or Return if symptoms worsen or persist.  o Meds sent to pharmacy  o 6 week follow up            Electronically Signed by: Yakelin Mcdonnell APRN -Author on August 10, 2020 12:24:17 PM

## 2021-05-13 NOTE — PROGRESS NOTES
Progress Note      Patient Name: Carina Sanchez   Patient ID: [id number]   Sex: Female   YOB: 1990    Primary Care Provider: Amber Byrne MD    Visit Date: May 1, 2020    Provider: Amber Byrne MD   Location: [facility]   Location Address: [address] [place] [address], [address]  [place], [place]  [id number]   Location Phone: [phone]          Chief Complaint  · follow up  · med refills  · concerta not working      History Of Present Illness  Video Conferencing Visit  Carina Sanchez is a 30 year old /White female who is presenting for evaluation via video conferencing. Verbal consent obtained before beginning visit.   The following staff were present during this visit: none; done by zoom   Carina Sanchez is a 30 year old /White female who presents for evaluation and treatment of:      states that isn't noticing much of a change with the concerta  she is still having a lot of trouble with concentration    her bp has been 120's typically when she is checking  mid to upper 80's  no chest pain  no trouble breathing  no palpitations       Past Medical History  Disease Name Date Onset Notes   Allergic rhinitis --  --    Anxiety --  --    Arthritis --  --    Bipolar Disorder --  --    GERD (gastroesophageal reflux disease) 04/13/2018 well controlled cont current meds   HTN (hypertension) --  --    Moderate episode of recurrent major depressive disorder 04/13/2018 Will increase lexapro to 10 mg. Will also increase wellbutrin to 150 mg BID, discussed risk of serotonin syndrome and signs and symptoms to look for with using lexapro and wellbutrin together. Discussed seeing a counselor to talk through her underlying social stressors, patient agrees to do so. Told patient that we will consider a referral to psychiatry at a later date if we are unable to get her depression under good control. Educated the patient on the importance of finding a career and goals that are  beneficial to her mental health.          Past Surgical History  Procedure Name Date Notes   Injection of keloid  Keloid removal both ears   [place] Tooth Extraction --  --          Medication List  Name Date Started Instructions   Allegra Allergy 180 mg oral tablet 10/18/2019 take 1 tablet (180 mg) by oral route once daily for 90 days   CeraVe topical cream 2020 apply to affected area(s) by topical route 2 times a day   Concerta 36 mg oral tablet extended release 24hr 2020 take 1 tablet (36 mg) by oral route once daily in the morning   Flonase Allergy Relief 50 mcg/actuation nasal spray,suspension 10/18/2019 spray 1 - 2 sprays (50 - 100 mcg) in each nostril by intranasal route once daily as needed   lisinopril 10 mg oral tablet 2020 take 1 tablet (10 mg) by oral route once daily for 90 days   naproxen 500 mg oral tablet 10/18/2019 take 1 tablet (500 mg) by oral route 2 times per day with food   omeprazole 40 mg oral capsule,delayed release(DR/EC) 2020 take 1 capsule (40 mg) by oral route once daily before a meal for 90 days   Tazorac 0.05 % topical gel 2020 apply to the affected area(s) by topical route once daily   Trintellix 10 mg oral tablet 2020 take 1 tablet (10 mg) by oral route once daily at the same time each day         Allergy List  Allergen Name Date Reaction Notes   Keflex --  Hives --          Reproductive History  Menstrual   Last Menstrual Period: 2015   Pregnancy Summary   Total Pregnancies: 0 Full Term: 0 Premature: 0   Ab Induced: 0 Ab Spontaneous: 0 Ectopics: 0   Multiples: 0 Livin         Social History  Finding Status Start/Stop Quantity Notes   Alcohol Current some day --/-- --  --    Tobacco Former --/-- 1 PPD Hx 9 years of smoking         Immunizations  NameDate Admin Mfg Trade Name Lot Number Route Inj VIS Given VIS Publication   [id number]/ [facility] Fluzone Quadrivalent [id number] IM RD 10/18/2019    Comments: pt tolerated well and left  office in stable condition[facility]   Ndmoujaxu54/07/2018 [facility] Fluzone Quadrivalent [id number] IM RD 11/07/2018 08/19/2014   Comments: tolerated well         Review of Systems  · Constitutional  o Denies  o : fever, fatigue, weight loss, weight gain  · Cardiovascular  o Denies  o : lower extremity edema, claudication, chest pressure, palpitations  · Respiratory  o Denies  o : shortness of breath, wheezing, cough, hemoptysis, dyspnea on exertion  · Gastrointestinal  o Denies  o : nausea, vomiting, diarrhea, constipation, abdominal pain      Physical Examination     Gen: well-nourished, no acute distress  HENT: atraumatic, normocephalic  Eyes: extraocular movements intact, no scleral icterus  Lung: breathing comfortably, no cough  Skin: no visible rash, no lesions  Neuro: grossly oriented to person, place, and time. no facial droop   Psych: normal mood and affect               Assessment  · Concentration deficit     799.51/R41.840  will increase concerta and see how she does as we started her on a low dose and she isn't having side effects      Plan  · Orders  o ACO-39: Current medications updated and reviewed () - - 05/01/2020  · Medications  o Concerta 36 mg oral tablet extended release 24hr   SIG: take 1 tablet (36 mg) by oral route once daily in the morning   DISP: (30) tablets with 0 refills  Adjusted on 05/01/2020     o Medications have been Reconciled  o Transition of Care or Provider Policy  · Instructions  o Patient was educated/instructed on their diagnosis, treatment and medications prior to discharge from the clinic today.  · Disposition  o 1 Month Follow Up            Electronically Signed by: Amber Byrne MD -Author on May 3, 2020 06:56:52 PM

## 2021-05-13 NOTE — PROGRESS NOTES
Progress Note      Patient Name: Carina Sanchez   Patient ID: [id number]   Sex: Female   YOB: 1990    Primary Care Provider: Amber Byrne MD    Visit Date: November 6, 2020    Provider: Amber Byrne MD   Location: [facility]   Location Address: [address] [place] [address], [address]  [place], [place]  [id number]   Location Phone: [phone]          History Of Present Illness  Video Conferencing Visit  Carina Sanchez is a 30 year old /White female who is presenting for evaluation via video conferencing via [employer]. Verbal consent obtained before beginning visit.   The following staff were present during this visit: none   Carina Sanchez is a 30 year old /White female who presents for evaluation and treatment of:      Chronic issues.    Mental health wise feels like she is on a bit of a roller coaster  Times where she deals with anger  Times where she deals with sadness  Times where she deals with anxiety  However denies any suicidal ideation  Also feels like sometimes the Trintellix makes her zombie  Has tried numerous other medications without success    HTN-  No chest pain  No trouble breathing  No leg  Does not check her blood pressure regularly    Obesity  Is frustrated with her weight  Admits there are times where she does not eat great but also other times where she does eat well       Past Medical History  Disease Name Date Onset Notes   Allergic rhinitis --  --    Anxiety --  --    Arthritis --  --    Bipolar Disorder --  --    GERD (gastroesophageal reflux disease) 04/13/2018 well controlled cont current meds   HTN (hypertension) --  --    Moderate episode of recurrent major depressive disorder 04/13/2018 Will increase lexapro to 10 mg. Will also increase wellbutrin to 150 mg BID, discussed risk of serotonin syndrome and signs and symptoms to look for with using lexapro and wellbutrin together. Discussed seeing a counselor to talk through her  underlying social stressors, patient agrees to do so. Told patient that we will consider a referral to psychiatry at a later date if we are unable to get her depression under good control. Educated the patient on the importance of finding a career and goals that are beneficial to her mental health.          Past Surgical History  Procedure Name Date Notes   Injection of keloid 2000 Keloid removal both ears   [place] Tooth Extraction --  --          Medication List  Name Date Started Instructions   Allegra Allergy 180 mg oral tablet 10/18/2019 take 1 tablet (180 mg) by oral route once daily for 90 days   CeraVe topical cream 2020 apply to affected area(s) by topical route 2 times a day   Depo-Provera 150 mg/mL intramuscular suspension 2020 inject 1 milliliter (150 mg) by intramuscular route every 3 months   Flonase Allergy Relief 50 mcg/actuation nasal spray,suspension 10/18/2019 spray 1 - 2 sprays (50 - 100 mcg) in each nostril by intranasal route once daily as needed   lisinopril 10 mg oral tablet 2020 TAKE 1 TABLET BY MOUTH ONCE daily   medroxyprogesterone 150 mg/mL intramuscular syringe 2020 inject 1 milliliter (150 mg) by intramuscular route every 3 months   naproxen 500 mg oral tablet 10/18/2019 take 1 tablet (500 mg) by oral route 2 times per day with food   omeprazole 40 mg oral capsule,delayed release(DR/EC) 2020 take 1 capsule (40 mg) by oral route once daily before a meal for 90 days   Tazorac 0.05 % topical gel 2020 apply to the affected area(s) by topical route once daily   Trintellix 20 mg oral tablet 10/23/2020 TAKE 1 TABLET BY MOUTH EVERY DAY AT the same time each day.         Allergy List  Allergen Name Date Reaction Notes   Keflex --  Hives --          Reproductive History  Menstrual   Last Menstrual Period: 2015   Pregnancy Summary   Total Pregnancies: 0 Full Term: 0 Premature: 0   Ab Induced: 0 Ab Spontaneous: 0 Ectopics: 0   Multiples: 0 Livin          Social History  Finding Status Start/Stop Quantity Notes   Alcohol Current some day --/-- --  --    Tobacco Former --/-- 1 PPD Hx 9 years of smoking         Immunizations  NameDate Admin Mfg Trade Name Lot Number Route Inj VIS Given VIS Publication   [id number]/18/2019 [facility] Fluzone Quadrivalent [id number] IM RD 10/18/2019    Comments: pt tolerated well and left office in stable condition, DESIREE Norman         Review of Systems  · Constitutional  o Denies  o : fever, fatigue, weight loss, weight gain  · Cardiovascular  o Denies  o : lower extremity edema, claudication, chest pressure, palpitations  · Respiratory  o Denies  o : shortness of breath, wheezing, frequent cough, hemoptysis, dyspnea on exertion  · Gastrointestinal  o Denies  o : nausea, vomiting, diarrhea, constipation, abdominal pain      Physical Examination                Assessment  · Moderate episode of recurrent major depressive disorder     296.32/F33.1  Will increase lexapro to 10 mg. Will also increase wellbutrin to 150 mg BID, discussed risk of serotonin syndrome and signs and symptoms to look for with using lexapro and wellbutrin together. Discussed seeing a counselor to talk through her underlying social stressors, patient agrees to do so. Told patient that we will consider a referral to psychiatry at a later date if we are unable to get her depression under good control. Educated the patient on the importance of finding a career and goals that are beneficial to her mental health.   · Anxiety     300.02/F41.1  · HTN (hypertension)     401.9/I10  · Obesity     278.00/E66.9  Discussed trying noon or weight watchers  Also discussed that we could try other things if we need to in the future     Will refer to psychiatry as we have tried numerous medications without success and believe their help would be beneficial at this point    For now stay on current medications    She will monitor blood pressure  Instructed her to get a blood pressure cuff      Problems Reconciled  Plan  · Orders  o ACO-39: Current medications updated and reviewed (, 1159F) - - 11/06/2020  o Psychiatric Consultation (PSYCH) - 296.32/F33.1, 300.02/F41.1 - 11/06/2020   Dr. Hull  · Medications  o Medications have been Reconciled  o Transition of Care or Provider Policy  · Instructions  o Patient was educated/instructed on their diagnosis, treatment and medications prior to discharge from the clinic today.            Electronically Signed by: Amber Byrne MD -Author on November 6, 2020 09:05:30 AM

## 2021-05-14 VITALS
HEART RATE: 102 BPM | OXYGEN SATURATION: 99 % | HEIGHT: 66 IN | SYSTOLIC BLOOD PRESSURE: 145 MMHG | RESPIRATION RATE: 20 BRPM | TEMPERATURE: 96.6 F | DIASTOLIC BLOOD PRESSURE: 91 MMHG | BODY MASS INDEX: 42.63 KG/M2 | WEIGHT: 265.25 LBS

## 2021-05-14 NOTE — PROGRESS NOTES
"   Progress Note      Patient Name: Carina Sanchez   Patient ID: [id number]   Sex: Female   YOB: 1990    Primary Care Provider: Amber Byrne MD   Referring Provider: Amber Byrne MD    Visit Date: December 17, 2020    Provider: Mihaela Hull MD   Location: [facility]   Location Address: [address] [place] [address]  [address] [place] [address]  [id number]   Location Phone: [phone]          Chief Complaint     \"I am about the same.\"           History Of Present Illness  Carina Sanchez is a 30 year old /White female who presents to the office today referred by Amber Byrne MD.   Chart 11/17: Seen November 6. Feels like she is on a roller coaster. On Trintellix 20 mg a day, highest dose. BMI is 44. March: Creatinine 1.08. Otherwise utd&wnl. No EKG or head imaging. PHQ 9 November 17 of 16, jaclyn 7 is 14, almost severe.   Virtual visit via Zoom audio and video due to the COVID-19 pandemic. Patient is accepting of and agreeable to appointment. The appointment consisted of the patient and I only. Interview: Mood is \"about the same.\" Still continues to have depression. Energy has improved to some extent, and the patient is not sleeping as much. She geovany with her depression by spending time with her dogs or talking to her mom. Still issues with concentration. She has not set up psychotherapy yet.   Denies SI HI AVH. No self-harm.   ...   ...   Medication Trials: Trintellix, Zoloft, Cymbalta, Lexapro, Concerta, Wellbutrin. None of these medications have helped.       Past Medical History  Disease Name Date Onset Notes   Allergic rhinitis --  --    Anxiety --  --    Arthritis --  --    Bipolar Disorder --  --    Depression (emotion) --  --    GERD (gastroesophageal reflux disease) 04/13/2018 well controlled cont current meds   HTN (hypertension) --  --    Moderate episode of recurrent major depressive disorder 04/13/2018 Will increase lexapro to 10 mg. Will also increase wellbutrin " to 150 mg BID, discussed risk of serotonin syndrome and signs and symptoms to look for with using lexapro and wellbutrin together. Discussed seeing a counselor to talk through her underlying social stressors, patient agrees to do so. Told patient that we will consider a referral to psychiatry at a later date if we are unable to get her depression under good control. Educated the patient on the importance of finding a career and goals that are beneficial to her mental health.          Past Surgical History  Procedure Name Date Notes   Injection of keloid 2000 Keloid removal both ears   [place] Tooth Extraction --  --          Medication List  Name Date Started Instructions   Abilify 2 mg oral tablet 11/17/2020 take 1 tablet (2 mg) by oral route once daily for 30 days   Allegra Allergy 180 mg oral tablet 11/06/2020 take 1 tablet (180 mg) by oral route once daily for 90 days   CeraVe topical cream 01/16/2020 apply to affected area(s) by topical route 2 times a day   Depo-Provera 150 mg/mL intramuscular suspension 12/03/2020 inject 1 milliliter (150 mg) by intramuscular route every 3 months   Flonase Allergy Relief 50 mcg/actuation nasal spray,suspension 11/06/2020 spray 1 - 2 sprays (50 - 100 mcg) in each nostril by intranasal route once daily as needed   lisinopril 10 mg oral tablet 11/06/2020 TAKE 1 TABLET BY MOUTH ONCE daily   naproxen 500 mg oral tablet 11/06/2020 take 1 tablet (500 mg) by oral route 2 times per day with food   omeprazole 40 mg oral capsule,delayed release(DR/EC) 12/16/2020 take 1 capsule (40 mg) by oral route once daily before a meal for 90 days   Tazorac 0.05 % topical gel 11/06/2020 apply to the affected area(s) by topical route once daily   Trintellix 20 mg oral tablet 11/06/2020 TAKE 1 TABLET BY MOUTH EVERY DAY AT the same time each day.         Allergy List  Allergen Name Date Reaction Notes   Keflex --  Hives --        Allergies Reconciled  Family Medical History  Disease Name Relative/Age  Notes   Family history of depression  --    Family history of anxiety disorder  --    Family history of substance abuse  --          Reproductive History  Menstrual   Last Menstrual Period: 2015   Pregnancy Summary   Total Pregnancies: 0 Full Term: 0 Premature: 0   Ab Induced: 0 Ab Spontaneous: 0 Ectopics: 0   Multiples: 0 Livin         Social History  Finding Status Start/Stop Quantity Notes   Alcohol Current some day --/-- --  2020 - 2020 -    Recreational Drug Use --  --/-- --  2020 -    Tobacco Current every day --/-- 1 PPD Hx 9 years of smoking         Immunizations  NameDate Admin Mfg Trade Name Lot Number Route Inj VIS Given VIS Publication   [id number]/ [facility] Fluzone Quadrivalent [id number] IM RD 10/18/2019    Comments: pt tolerated well and left office in stable condition, DESIREE Norman         Review of Systems  · Constitutional  o Denies  o : fatigue, night sweats  · Eyes  o Denies  o : double vision, blurred vision  · HENT  o Denies  o : vertigo, recent head injury  · Breasts  o Denies  o : abnormal changes in breast size, additional breast symptoms except as noted in the HPI  · Cardiovascular  o Denies  o : chest pain, irregular heart beats  · Respiratory  o Admits  o : productive cough  o Denies  o : shortness of breath  · Gastrointestinal  o Denies  o : nausea, vomiting  · Genitourinary  o Denies  o : dysuria, urinary retention  · Integument  o Denies  o : hair growth change, new skin lesions  · Neurologic  o Denies  o : altered mental status, seizures  · Musculoskeletal  o Denies  o : joint swelling, limitation of motion  · Endocrine  o Denies  o : cold intolerance, heat intolerance  · Psychiatric  o Admits  o : anxiety, depression  · Heme-Lymph  o Denies  o : petechiae, lymph node enlargement or tenderness  · Allergic-Immunologic  o Denies  o : frequent illnesses      Physical Examination  · Mental Status Exam  o Appearance  o : good eye contact, normal street clothes,  "groomed, lying on her bed in her bedroom  o Behavior  o : pleasant and cooperative  o Motor  o : no abnormal  o Speech  o : normal rhythm, rate, volume, tone, not hyperverbal, not pressured, normal prosidy  o Mood  o : \"About the same\"  o Affect  o : Slightly depressed, no tears, able to smile  o Thought Content  o : negative suicidal ideations, negative homicidal ideations, negative obsessions  o Perceptions  o : negative auditory hallucinations, negative visual hallucinations  o Thought Process  o : Linear  o Insight/Judgement  o : fair/fair  o Cognition  o : grossly intact  o Attention  o : intact          Assessment  · Screening for depression     V79.0/Z13.31  · Tobacco abuse counseling       Tobacco abuse counseling     V65.42/Z71.6  · Depressive Disorder     311/F32.9  · Anxiety     300.02/F41.1       Presentation most consistent with cluster B personality disorder, very likely borderline.  Possible major depressive disorder, with anxious distress.    Some minor improvement.  It is been a month, so I would rather switch Abilify with bupropion.  Vortioxetine reduces the metabolism of bupropion, so I should be careful to start her on a low-dose.    Encouraged psychotherapy.  I would least like to see how well the patient is doing on a combination of medication and psychotherapy.  Psychotherapy is the mainstay of treatment for personality disorders, and it is recommended; referral made.      Patient to return in 1 month.       Plan  · Orders  o Smoking and tobacco cessation counseling visit for the asymptomatic patient; intermediate, greater than 3 minutes, up to 10 minutes [facility] ([id number]) - V65.42/Z71.6 - 12/17/2020  o ACO-39: Current medications updated and reviewed (, 1159F) - - 12/17/2020  · Medications  o bupropion HCl 150 mg oral tablet extended release 24 hr   SIG: take 1 tablet (150 mg) by oral route once daily for 30 days   DISP: (30) Tablet with 1 refills  Prescribed on 12/17/2020     o Abilify 2 mg " oral tablet   SIG: take 1 tablet (2 mg) by oral route once daily for 30 days   DISP: (30) Tablet with 1 refills  Discontinued on 12/17/2020     o Medications have been Reconciled  o Transition of Care or Provider Policy  · Instructions  o Tobacco and smoking cessation counseling for more than 3 minutes was completed.  o Risk Assessment: Risk of self-harm acutely is moderate to high. Risk factors include depressive disorder, anxiety disorder, personality disorder, access to weapons, history of self-harm, family history of suicide attempts, some AODA. Protective factors include no present SI, no history of suicide attempts, healthcare seeking, future orientation, willingness to engage in care, psychologically minded. Risk of self-harm chronically is also moderate to high, and could be further elevated in the event of treatment noncompliance and/or AODA.  o Safety: No acute safety concerns.  o Medications: Continue Trintellix 20 mg p.o. daily. Discontinue Abilify 2 mg p.o. daily. START Bupropion  mg p.o. daily to target depression and anxiety. Risks, benefits, alternatives discussed with patient including nausea, GI upset, increased energy, exacerbation of irritability, lowering of seizure threshold. After discussion of these risks and benefits, the patient voiced understanding and agreed to proceed.  o Therapy: Recommended; referral made. Patient desires to have at least the initial visit in person, and during the pandemic she may have difficulty finding a clinic that will accommodate this wish.  o Follow Up: 1 month.  · Disposition  o Follow Up in 1 month.            Electronically Signed by: Mihaela Hull MD -Author on December 17, 2020 01:28:23 PM

## 2021-05-14 NOTE — PROGRESS NOTES
"   Progress Note      Patient Name: Carina Sanchez   Patient ID: [id number]   Sex: Female   YOB: 1990    Primary Care Provider: Amber Byrne MD   Referring Provider: Amber Byrne MD    Visit Date: February 25, 2021    Provider: Mihaela Hull MD   Location: [facility]   Location Address: [address] [place] [address]  [address] [place] [address]  [id number]   Location Phone: [phone]          Chief Complaint     \"Not good.\"           History Of Present Illness  Carina Sanchez is a 31 year old /White female who presents to the office today referred by Amber Byrne MD.   Chart 11/17: Seen November 6. Feels like she is on a roller coaster. On Trintellix 20 mg a day, highest dose. BMI is 44. March: Creatinine 1.08. Otherwise utd&wnl. No EKG or head imaging. PHQ 9 November 17 of 16, jaclyn 7 is 14, almost severe.   Virtual visit via Zoom audio and video due to the COVID-19 pandemic. Patient is accepting of and agreeable to appointment. The appointment consisted of the patient and I only. Interview: Patient reports she is having increased suicidal ideation, but without plan or intent. States when she feels bad she talks to her friend, which helps quite a bit. None presently. Continues to sleep too much. Feels more depressed. Feels like she was doing better on the Abilify. No present SI HI AVH.   ...   ...   ...   Medication Trials: Trintellix, Zoloft, Cymbalta, Lexapro, Concerta, Wellbutrin. None of these medications alone have helped.       Past Medical History  Disease Name Date Onset Notes   Allergic rhinitis --  --    Anxiety --  --    Arthritis --  --    Bipolar Disorder --  --    Depression (emotion) --  --    GERD (gastroesophageal reflux disease) 04/13/2018 well controlled cont current meds   HTN (hypertension) --  --    Moderate episode of recurrent major depressive disorder 04/13/2018 Will increase lexapro to 10 mg. Will also increase wellbutrin to 150 mg BID, discussed " risk of serotonin syndrome and signs and symptoms to look for with using lexapro and wellbutrin together. Discussed seeing a counselor to talk through her underlying social stressors, patient agrees to do so. Told patient that we will consider a referral to psychiatry at a later date if we are unable to get her depression under good control. Educated the patient on the importance of finding a career and goals that are beneficial to her mental health.          Past Surgical History  Procedure Name Date Notes   Injection of keloid 2000 Keloid removal both ears   [place] Tooth Extraction --  --          Medication List  Name Date Started Instructions   Allegra Allergy 180 mg oral tablet 02/17/2021 take 1 tablet (180 mg) by oral route once daily for 90 days   bupropion HCl 150 mg oral tablet extended release 24 hr 01/19/2021 take 1 tablet (150 mg) by oral route once daily for 90 days   CeraVe topical cream 01/16/2020 apply to affected area(s) by topical route 2 times a day   Depo-Provera 150 mg/mL intramuscular suspension 02/17/2021 inject 1 milliliter (150 mg) by intramuscular route every 3 months   Flonase Allergy Relief 50 mcg/actuation nasal spray,suspension 02/17/2021 spray 1 - 2 sprays (50 - 100 mcg) in each nostril by intranasal route once daily as needed   lisinopril 10 mg oral tablet 02/17/2021 TAKE 1 TABLET BY MOUTH ONCE daily   naproxen 500 mg oral tablet 02/17/2021 take 1 tablet (500 mg) by oral route 2 times per day with food   omeprazole 40 mg oral capsule,delayed release(DR/EC) 02/17/2021 take 1 capsule (40 mg) by oral route once daily before a meal for 90 days   Tazorac 0.05 % topical gel 11/06/2020 apply to the affected area(s) by topical route once daily   Trintellix 20 mg oral tablet 02/17/2021 TAKE 1 TABLET BY MOUTH EVERY DAY AT the same time each day.         Allergy List  Allergen Name Date Reaction Notes   Keflex --  Hives --          Family Medical History  Disease Name Relative/Age Notes   Family  history of depression  --    Family history of anxiety disorder  --    Family history of substance abuse  --          Reproductive History  Menstrual   Last Menstrual Period: 2015   Pregnancy Summary   Total Pregnancies: 0 Full Term: 0 Premature: 0   Ab Induced: 0 Ab Spontaneous: 0 Ectopics: 0   Multiples: 0 Livin         Social History  Finding Status Start/Stop Quantity Notes   Alcohol Current some day --/-- --  2021 - 2020 - 2020 -    Recreational Drug Use --  --/-- --  2021 - 2020 -    Tobacco Current every day --/-- 1 PPD Hx 9 years of smoking         Immunizations  NameDate Admin Mfg Trade Name Lot Number Route Inj VIS Given VIS Publication   [id number]/ [facility] Fluzone Quadrivalent [id number] IM RD 10/18/2019    Comments: pt tolerated well and left office in stable condition, DESIREE oNrman         Review of Systems  · Constitutional  o Denies  o : fatigue, night sweats  · Eyes  o Denies  o : double vision, blurred vision  · HENT  o Denies  o : vertigo, recent head injury  · Breasts  o Denies  o : abnormal changes in breast size, additional breast symptoms except as noted in the HPI  · Cardiovascular  o Denies  o : chest pain, irregular heart beats  · Respiratory  o Denies  o : shortness of breath, productive cough  · Gastrointestinal  o Denies  o : nausea, vomiting  · Genitourinary  o Denies  o : dysuria, urinary retention  · Integument  o Denies  o : hair growth change, new skin lesions  · Neurologic  o Denies  o : altered mental status, seizures  · Musculoskeletal  o Denies  o : joint swelling, limitation of motion  · Endocrine  o Denies  o : cold intolerance, heat intolerance  · Heme-Lymph  o Denies  o : petechiae, lymph node enlargement or tenderness  · Allergic-Immunologic  o Denies  o : frequent illnesses      Physical Examination  · Mental Status Exam  o Appearance  o : good eye contact, normal street clothes, groomed, in a room in her house  o Behavior  o :  "pleasant and cooperative  o Motor  o : no abnormal  o Speech  o : normal rhythm, rate, volume, tone, not hyperverbal, not pressured, normal prosidy  o Mood  o : \"Not good\"  o Affect  o : Euthymic  o Thought Content  o : negative suicidal ideations, negative homicidal ideations, negative obsessions  o Perceptions  o : negative auditory hallucinations, negative visual hallucinations  o Thought Process  o : Linear  o Insight/Judgement  o : fair/fair  o Cognition  o : grossly intact  o Attention  o : intact          Assessment  · Tobacco abuse counseling       Tobacco abuse counseling     V65.42/Z71.6  · Depressive Disorder     311/F32.9  · Anxiety     300.02/F41.1       Presentation most consistent with cluster B personality disorder, very likely borderline.  Possible major depressive disorder, with anxious distress.    Discontinue Wellbutrin.  Start Abilify.  See back in 6 weeks.    Patient will set up psychotherapy when she can have an in person visit initially.      Patient to return in 1 month.       Plan  · Orders  o Smoking and tobacco cessation counseling visit for the asymptomatic patient; intermediate, greater than 3 minutes, up to 10 minutes [facility] ([id number]) - V65.42/Z71.6 - 02/25/2021  o ACO-39: Current medications updated and reviewed (, 1159F) - - 02/25/2021  · Medications  o aripiprazole 5 mg oral tablet   SIG: take 1 tablet (5 mg) by oral route once daily for 90 days   DISP: (90) Tablet with 1 refills  Prescribed on 02/25/2021     o bupropion HCl 150 mg oral tablet extended release 24 hr   SIG: take 1 tablet (150 mg) by oral route once daily for 90 days   DISP: (90) Tablet with 1 refills  Discontinued on 02/25/2021     o Medications have been Reconciled  o Transition of Care or Provider Policy  · Instructions  o Tobacco and smoking cessation counseling for more than 3 minutes was completed.  o Risk Assessment: Risk of self-harm acutely is moderate to high. Risk factors include depressive disorder, " anxiety disorder, personality disorder, access to weapons, history of self-harm, family history of suicide attempts, some AODA. Protective factors include no present SI, no history of suicide attempts, healthcare seeking, future orientation, willingness to engage in care, psychologically minded. Risk of self-harm chronically is also moderate to high, and could be further elevated in the event of treatment noncompliance and/or AODA.  o Safety: No acute safety concerns.  o Medications: CONTINUE Trintellix 20 mg p.o. daily. DISCONTINUE Bupropion  mg p.o. daily. RE-START Abilify 2.5 mg p.o. daily for 3 days, then INCREASE to 5 mg PO QDAY to target depression, anxiety, decreased energy. Risks, benefits, alternatives discussed with patient including increased energy, exacerbation of irritability, akathisia, GI upset, orthostatic hypotension. After discussion of these risks and benefits, the patient voiced understanding and agreed to proceed.  o Therapy: Recommended; referral made. Patient desires to have at least the initial visit in person, and during the pandemic she may have difficulty finding a clinic that will accommodate this wish.  o Follow Up: 6 wks.  · Disposition  o Follow Up in 2 months.            Electronically Signed by: Mihaela Hull MD -Author on February 25, 2021 02:50:47 PM

## 2021-05-14 NOTE — PROGRESS NOTES
"   Progress Note      Patient Name: Carina Sanchez   Patient ID: [id number]   Sex: Female   YOB: 1990    Primary Care Provider: Amber Byrne MD   Referring Provider: Amber Byrne MD    Visit Date: February 17, 2021    Provider: Amber Byrne MD   Location: [facility]   Location Address: [address] [place] [address]  [id number]   Location Phone: [phone]          Chief Complaint  · \"I am been having numbness in my left hand and fingers for about a week now\"      History Of Present Illness  Video Conferencing Visit  Carina Sanchez is a 30 year old /White female who is presenting for evaluation via video conferencing via zoom. Verbal consent obtained before beginning visit.   The following staff were present during this visit: none   Carina Sanchez is a 30 year old /White female who presents for evaluation and treatment of:      chronic issues.    tried abilify states that this did not work well at all for her    then tried Wellbutrin and Trintellix together  first month was ok, 2nd month worst bout of depression ever, states that she just didn't want to get out of bed  has been seeing Dr. Hull for this she has an appointment to follow-up with him this month    currently no SI does have fleeting moments of this from time to time however states she would never act on it and is willing to reach out for help if it happens    states that when she wakes up she is having numbness in her left hand  she notices it more in the fingers rather than in the thumb  it comes and goes throughout the day  Does feel like she puts pressure on her elbow as she sits at work throughout the day    No chest pain  No trouble breathing       Past Medical History  Disease Name Date Onset Notes   Allergic rhinitis --  --    Anxiety --  --    Arthritis --  --    Bipolar Disorder --  --    Depression (emotion) --  --    GERD (gastroesophageal reflux disease) " 04/13/2018 well controlled cont current meds   HTN (hypertension) --  --    Moderate episode of recurrent major depressive disorder 04/13/2018 Will increase lexapro to 10 mg. Will also increase wellbutrin to 150 mg BID, discussed risk of serotonin syndrome and signs and symptoms to look for with using lexapro and wellbutrin together. Discussed seeing a counselor to talk through her underlying social stressors, patient agrees to do so. Told patient that we will consider a referral to psychiatry at a later date if we are unable to get her depression under good control. Educated the patient on the importance of finding a career and goals that are beneficial to her mental health.          Past Surgical History  Procedure Name Date Notes   Injection of keloid 2000 Keloid removal both ears   [place] Tooth Extraction --  --          Medication List  Name Date Started Instructions   Allegra Allergy 180 mg oral tablet 02/17/2021 take 1 tablet (180 mg) by oral route once daily for 90 days   bupropion HCl 150 mg oral tablet extended release 24 hr 01/19/2021 take 1 tablet (150 mg) by oral route once daily for 90 days   CeraVe topical cream 01/16/2020 apply to affected area(s) by topical route 2 times a day   Depo-Provera 150 mg/mL intramuscular suspension 02/17/2021 inject 1 milliliter (150 mg) by intramuscular route every 3 months   Flonase Allergy Relief 50 mcg/actuation nasal spray,suspension 02/17/2021 spray 1 - 2 sprays (50 - 100 mcg) in each nostril by intranasal route once daily as needed   lisinopril 10 mg oral tablet 02/17/2021 TAKE 1 TABLET BY MOUTH ONCE daily   naproxen 500 mg oral tablet 02/17/2021 take 1 tablet (500 mg) by oral route 2 times per day with food   omeprazole 40 mg oral capsule,delayed release(DR/EC) 02/17/2021 take 1 capsule (40 mg) by oral route once daily before a meal for 90 days   Tazorac 0.05 % topical gel 11/06/2020 apply to the affected area(s) by topical route once daily   Trintellix 20 mg oral  tablet 2021 TAKE 1 TABLET BY MOUTH EVERY DAY AT the same time each day.         Allergy List  Allergen Name Date Reaction Notes   Keflex --  Hives --          Family Medical History  Disease Name Relative/Age Notes   Family history of depression  --    Family history of anxiety disorder  --    Family history of substance abuse  --          Reproductive History  Menstrual   Last Menstrual Period: 2015   Pregnancy Summary   Total Pregnancies: 0 Full Term: 0 Premature: 0   Ab Induced: 0 Ab Spontaneous: 0 Ectopics: 0   Multiples: 0 Livin         Social History  Finding Status Start/Stop Quantity Notes   Alcohol Current some day --/-- --  2021 - 2020 - 2020 -    Recreational Drug Use --  --/-- --  2021 - 2020 -    Tobacco Current every day --/-- 1 PPD Hx 9 years of smoking         Immunizations  NameDate Admin Mfg Trade Name Lot Number Route Inj VIS Given VIS Publication   [id number]/ [facility] Fluzone Quadrivalent [id number] IM RD 10/18/2019    Comments: pt tolerated well and left office in stable condition, DESIREE Norman         Physical Examination     Gen: well-nourished, no acute distress  HENT: atraumatic, normocephalic  Eyes: extraocular movements intact, no scleral icterus  Lung: breathing comfortably, no cough  Skin: no visible rash, no lesions  Neuro: grossly oriented to person, place, and time. no facial droop   Psych: normal mood and affect             Assessment  · GERD (gastroesophageal reflux disease)     530.81/K21.9  well controlled cont current meds  · Moderate episode of recurrent major depressive disorder     296.32/F33.1  Continue to work with Dr. Hull to find the right medications for her  · HTN (hypertension)     401.9/I10  Well-controlled continue current meds  Will check labs and adjust meds as needed based on results  · Screening for lipid disorders     V77.91/Z13.220  · Cubital tunnel syndrome     354.2/G56.20  Discussed conservative management for  this she will work on positioning take her naproxen every day for 5 days  Discussed taking with food and protecting her stomach    Problems Reconciled  Plan  · Orders  o Lipid Panel [facility] ([id number]) - V77.91/Z13.220 - 02/17/2021  o CBC with Auto Diff [facility] ([id number]) - 401.9/I10 - 02/17/2021  o CMP [facility] ([id number]) - 401.9/I10, V77.91/Z13.220 - 02/17/2021  o TSH [facility] ([id number]) - 401.9/I10, 296.32/F33.1 - 02/17/2021  o ACO-39: Current medications updated and reviewed (1159F, ) - - 02/17/2021  · Medications  o Allegra Allergy 180 mg oral tablet   SIG: take 1 tablet (180 mg) by oral route once daily for 90 days   DISP: (90) Tablet with 1 refills  Refilled on 02/17/2021     o Depo-Provera 150 mg/mL intramuscular suspension   SIG: inject 1 milliliter (150 mg) by intramuscular route every 3 months   DISP: (1) Vial with 0 refills  Refilled on 02/17/2021     o Flonase Allergy Relief 50 mcg/actuation nasal spray,suspension   SIG: spray 1 - 2 sprays (50 - 100 mcg) in each nostril by intranasal route once daily as needed   DISP: (1) Box with 2 refills  Refilled on 02/17/2021     o lisinopril 10 mg oral tablet   SIG: TAKE 1 TABLET BY MOUTH ONCE daily   DISP: (90) Tablet with 1 refills  Refilled on 02/17/2021     o naproxen 500 mg oral tablet   SIG: take 1 tablet (500 mg) by oral route 2 times per day with food   DISP: (90) Tablet with 1 refills  Refilled on 02/17/2021     o omeprazole 40 mg oral capsule,delayed release(DR/EC)   SIG: take 1 capsule (40 mg) by oral route once daily before a meal for 90 days   DISP: (90) Capsule with 1 refills  Refilled on 02/17/2021     o Trintellix 20 mg oral tablet   SIG: TAKE 1 TABLET BY MOUTH EVERY DAY AT the same time each day.   DISP: (90) Tablet with 1 refills  Refilled on 02/17/2021     o Transition of Care or Provider Policy  · Instructions  o Patient was educated/instructed on their diagnosis, treatment and medications prior to discharge from the clinic today.  · Disposition  o 3 Month Follow  Up            Electronically Signed by: Amber Bynre MD -Author on February 17, 2021 08:29:53 AM

## 2021-05-14 NOTE — PROGRESS NOTES
"   Progress Note      Patient Name: Carina Sanchez   Patient ID: [id number]   Sex: Female   YOB: 1990    Primary Care Provider: Amber Byrne MD   Referring Provider: Amber Byrne MD    Visit Date: January 19, 2021    Provider: Mihaela Hull MD   Location: [facility]   Location Address: [address] [place] [address]  [address] [place] [address]  [id number]   Location Phone: [phone]          Chief Complaint     The medication is helping a little.\"           History Of Present Illness  Carina Sanchez is a 30 year old /White female who presents to the office today referred by Amber Byrne MD.   Chart 11/17: Seen November 6. Feels like she is on a roller coaster. On Trintellix 20 mg a day, highest dose. BMI is 44. March: Creatinine 1.08. Otherwise utd&wnl. No EKG or head imaging. PHQ 9 November 17 of 16, jaclyn 7 is 14, almost severe.   Virtual visit via Zoom audio and video due to the COVID-19 pandemic. Patient is accepting of and agreeable to appointment. The appointment consisted of the patient and I only. Interview: Mood has improved, and is \"not as bad.\" Denies fatigue altogether, and notes an improvement in concentration, although she states it is hard to tell as she is worried about her grandfather who may have prostate issues (prostate cancer?). Because she is stressed about him, she states it is hard to say whether her irritability has improved or not. Notes that she is no longer oversleeping. Has not yet set up psychotherapy.   Denies SI HI AVH. No self-harm.   ...   ...   Medication Trials: Trintellix, Zoloft, Cymbalta, Lexapro, Concerta, Wellbutrin. None of these medications alone have helped.       Past Medical History  Disease Name Date Onset Notes   Allergic rhinitis --  --    Anxiety --  --    Arthritis --  --    Bipolar Disorder --  --    Depression (emotion) --  --    GERD (gastroesophageal reflux disease) 04/13/2018 well controlled cont current meds   HTN " (hypertension) --  --    Moderate episode of recurrent major depressive disorder 04/13/2018 Will increase lexapro to 10 mg. Will also increase wellbutrin to 150 mg BID, discussed risk of serotonin syndrome and signs and symptoms to look for with using lexapro and wellbutrin together. Discussed seeing a counselor to talk through her underlying social stressors, patient agrees to do so. Told patient that we will consider a referral to psychiatry at a later date if we are unable to get her depression under good control. Educated the patient on the importance of finding a career and goals that are beneficial to her mental health.          Past Surgical History  Procedure Name Date Notes   Injection of keloid 2000 Keloid removal both ears   [place] Tooth Extraction --  --          Medication List  Name Date Started Instructions   Allegra Allergy 180 mg oral tablet 11/06/2020 take 1 tablet (180 mg) by oral route once daily for 90 days   bupropion HCl 150 mg oral tablet extended release 24 hr 12/17/2020 take 1 tablet (150 mg) by oral route once daily for 30 days   CeraVe topical cream 01/16/2020 apply to affected area(s) by topical route 2 times a day   Depo-Provera 150 mg/mL intramuscular suspension 12/03/2020 inject 1 milliliter (150 mg) by intramuscular route every 3 months   Flonase Allergy Relief 50 mcg/actuation nasal spray,suspension 11/06/2020 spray 1 - 2 sprays (50 - 100 mcg) in each nostril by intranasal route once daily as needed   lisinopril 10 mg oral tablet 11/06/2020 TAKE 1 TABLET BY MOUTH ONCE daily   naproxen 500 mg oral tablet 11/06/2020 take 1 tablet (500 mg) by oral route 2 times per day with food   omeprazole 40 mg oral capsule,delayed release(DR/EC) 12/16/2020 take 1 capsule (40 mg) by oral route once daily before a meal for 90 days   Tazorac 0.05 % topical gel 11/06/2020 apply to the affected area(s) by topical route once daily   Trintellix 20 mg oral tablet 11/06/2020 TAKE 1 TABLET BY MOUTH EVERY DAY  AT the same time each day.         Allergy List  Allergen Name Date Reaction Notes   Keflex --  Hives --          Family Medical History  Disease Name Relative/Age Notes   Family history of depression  --    Family history of anxiety disorder  --    Family history of substance abuse  --          Reproductive History  Menstrual   Last Menstrual Period: 2015   Pregnancy Summary   Total Pregnancies: 0 Full Term: 0 Premature: 0   Ab Induced: 0 Ab Spontaneous: 0 Ectopics: 0   Multiples: 0 Livin         Social History  Finding Status Start/Stop Quantity Notes   Alcohol Current some day --/-- --  2021 - 2020 - 2020 -    Recreational Drug Use --  --/-- --  2021 - 2020 -    Tobacco Current every day --/-- 1 PPD Hx 9 years of smoking         Immunizations  NameDate Admin Mfg Trade Name Lot Number Route Inj VIS Given VIS Publication   [id number]/ [facility] Fluzone Quadrivalent [id number] IM RD 10/18/2019    Comments: pt tolerated well and left office in stable condition, DESIREE Norman         Review of Systems  · Constitutional  o Denies  o : fatigue, night sweats  · Eyes  o Denies  o : double vision, blurred vision  · HENT  o Denies  o : vertigo, recent head injury  · Breasts  o Denies  o : abnormal changes in breast size, additional breast symptoms except as noted in the HPI  · Cardiovascular  o Denies  o : chest pain, irregular heart beats  · Respiratory  o Admits  o : productive cough  o Denies  o : shortness of breath  · Gastrointestinal  o Denies  o : nausea, vomiting  · Genitourinary  o Denies  o : dysuria, urinary retention  · Integument  o Denies  o : hair growth change, new skin lesions  · Neurologic  o Denies  o : altered mental status, seizures  · Musculoskeletal  o Denies  o : joint swelling, limitation of motion  · Endocrine  o Denies  o : cold intolerance, heat intolerance  · Heme-Lymph  o Denies  o : petechiae, lymph node enlargement or  "tenderness  · Allergic-Immunologic  o Denies  o : frequent illnesses      Physical Examination  · Mental Status Exam  o Appearance  o : good eye contact, normal street clothes, groomed, walking around outside on a porch  o Behavior  o : pleasant and cooperative  o Motor  o : no abnormal  o Speech  o : normal rhythm, rate, volume, tone, not hyperverbal, not pressured, normal prosidy  o Mood  o : \"Not as bad\"  o Affect  o : Euthymic  o Thought Content  o : negative suicidal ideations, negative homicidal ideations, negative obsessions  o Perceptions  o : negative auditory hallucinations, negative visual hallucinations  o Thought Process  o : Linear  o Insight/Judgement  o : fair/fair  o Cognition  o : grossly intact  o Attention  o : intact          Assessment  · Screening for depression     V79.0/Z13.31  · Tobacco abuse counseling       Tobacco abuse counseling     V65.42/Z71.6  · Depressive Disorder     311/F32.9  · Anxiety     300.02/F41.1       Presentation most consistent with cluster B personality disorder, very likely borderline.  Possible major depressive disorder, with anxious distress.    Improved.  Continue medications as prescribed.  Vortioxetine reduces the metabolism of bupropion, so I should be careful to start her on a low-dose.    Patient will set up psychotherapy when she can have an in person visit initially.      Patient to return in 1 month.       Plan  · Orders  o Smoking and tobacco cessation counseling visit for the asymptomatic patient; intermediate, greater than 3 minutes, up to 10 minutes [facility] ([id number]) - V65.42/Z71.6 - 01/19/2021  o ACO-39: Current medications updated and reviewed (, 1159F) - - 01/19/2021  · Medications  o bupropion HCl 150 mg oral tablet extended release 24 hr   SIG: take 1 tablet (150 mg) by oral route once daily for 90 days   DISP: (90) Tablet with 1 refills  Refilled on 01/19/2021     o Medications have been Reconciled  o Transition of Care or Provider " Policy  · Instructions  o Tobacco and smoking cessation counseling for more than 3 minutes was completed.  o Risk Assessment: Risk of self-harm acutely is moderate to high. Risk factors include depressive disorder, anxiety disorder, personality disorder, access to weapons, history of self-harm, family history of suicide attempts, some AODA. Protective factors include no present SI, no history of suicide attempts, healthcare seeking, future orientation, willingness to engage in care, psychologically minded. Risk of self-harm chronically is also moderate to high, and could be further elevated in the event of treatment noncompliance and/or AODA.  o Safety: No acute safety concerns.  o Medications: CONTINUE Trintellix 20 mg p.o. daily, Bupropion  mg p.o. daily to target depression and anxiety. Risks, benefits, alternatives discussed with patient including nausea, GI upset, increased energy, exacerbation of irritability, lowering of seizure threshold. After discussion of these risks and benefits, the patient voiced understanding and agreed to proceed. *** s/p abilify.  o Therapy: Recommended; referral made. Patient desires to have at least the initial visit in person, and during the pandemic she may have difficulty finding a clinic that will accommodate this wish.  o Follow Up: 1 month.  · Disposition  o Follow Up in 1 month.            Electronically Signed by: Mihaela Hull MD -Author on January 19, 2021 01:15:45 PM

## 2021-05-15 VITALS
DIASTOLIC BLOOD PRESSURE: 76 MMHG | WEIGHT: 259.25 LBS | HEIGHT: 66 IN | SYSTOLIC BLOOD PRESSURE: 110 MMHG | TEMPERATURE: 96.9 F | RESPIRATION RATE: 16 BRPM | OXYGEN SATURATION: 98 % | HEART RATE: 87 BPM | BODY MASS INDEX: 41.66 KG/M2

## 2021-05-15 VITALS
SYSTOLIC BLOOD PRESSURE: 102 MMHG | HEIGHT: 66 IN | BODY MASS INDEX: 41.64 KG/M2 | OXYGEN SATURATION: 97 % | TEMPERATURE: 97.6 F | HEART RATE: 90 BPM | WEIGHT: 259.12 LBS | RESPIRATION RATE: 16 BRPM | DIASTOLIC BLOOD PRESSURE: 72 MMHG

## 2021-05-15 VITALS
WEIGHT: 262.37 LBS | HEART RATE: 112 BPM | SYSTOLIC BLOOD PRESSURE: 122 MMHG | BODY MASS INDEX: 42.17 KG/M2 | OXYGEN SATURATION: 97 % | TEMPERATURE: 98.2 F | DIASTOLIC BLOOD PRESSURE: 78 MMHG | HEIGHT: 66 IN

## 2021-05-15 VITALS
OXYGEN SATURATION: 99 % | SYSTOLIC BLOOD PRESSURE: 112 MMHG | HEIGHT: 66 IN | WEIGHT: 262 LBS | BODY MASS INDEX: 42.11 KG/M2 | HEART RATE: 76 BPM | DIASTOLIC BLOOD PRESSURE: 74 MMHG

## 2021-05-15 VITALS
WEIGHT: 276.12 LBS | DIASTOLIC BLOOD PRESSURE: 74 MMHG | SYSTOLIC BLOOD PRESSURE: 120 MMHG | HEIGHT: 66 IN | HEART RATE: 100 BPM | BODY MASS INDEX: 44.38 KG/M2 | OXYGEN SATURATION: 98 % | TEMPERATURE: 97.3 F

## 2021-05-15 VITALS
SYSTOLIC BLOOD PRESSURE: 122 MMHG | OXYGEN SATURATION: 95 % | HEIGHT: 66 IN | WEIGHT: 250.37 LBS | BODY MASS INDEX: 40.24 KG/M2 | DIASTOLIC BLOOD PRESSURE: 82 MMHG | HEART RATE: 103 BPM | TEMPERATURE: 97.8 F

## 2021-05-15 VITALS
WEIGHT: 268.12 LBS | SYSTOLIC BLOOD PRESSURE: 122 MMHG | BODY MASS INDEX: 43.09 KG/M2 | DIASTOLIC BLOOD PRESSURE: 78 MMHG | TEMPERATURE: 99.2 F | HEART RATE: 107 BPM | HEIGHT: 66 IN | OXYGEN SATURATION: 96 %

## 2021-05-15 VITALS
BODY MASS INDEX: 24.59 KG/M2 | OXYGEN SATURATION: 99 % | SYSTOLIC BLOOD PRESSURE: 124 MMHG | WEIGHT: 153 LBS | RESPIRATION RATE: 16 BRPM | TEMPERATURE: 98.4 F | HEART RATE: 85 BPM | HEIGHT: 66 IN | DIASTOLIC BLOOD PRESSURE: 68 MMHG

## 2021-05-16 VITALS
SYSTOLIC BLOOD PRESSURE: 102 MMHG | DIASTOLIC BLOOD PRESSURE: 76 MMHG | OXYGEN SATURATION: 96 % | RESPIRATION RATE: 16 BRPM | BODY MASS INDEX: 40.7 KG/M2 | WEIGHT: 253.25 LBS | TEMPERATURE: 97.6 F | HEART RATE: 88 BPM | HEIGHT: 66 IN

## 2021-05-16 VITALS
BODY MASS INDEX: 41.46 KG/M2 | HEART RATE: 90 BPM | DIASTOLIC BLOOD PRESSURE: 86 MMHG | WEIGHT: 258 LBS | RESPIRATION RATE: 16 BRPM | OXYGEN SATURATION: 98 % | SYSTOLIC BLOOD PRESSURE: 120 MMHG | HEIGHT: 66 IN | TEMPERATURE: 96.9 F

## 2021-05-16 VITALS
HEIGHT: 66 IN | HEART RATE: 84 BPM | DIASTOLIC BLOOD PRESSURE: 84 MMHG | OXYGEN SATURATION: 97 % | BODY MASS INDEX: 40.24 KG/M2 | WEIGHT: 250.37 LBS | SYSTOLIC BLOOD PRESSURE: 120 MMHG | TEMPERATURE: 96.4 F

## 2021-05-16 VITALS
RESPIRATION RATE: 16 BRPM | WEIGHT: 249.25 LBS | TEMPERATURE: 97.2 F | BODY MASS INDEX: 40.06 KG/M2 | HEIGHT: 66 IN | DIASTOLIC BLOOD PRESSURE: 86 MMHG | OXYGEN SATURATION: 95 % | HEART RATE: 84 BPM | SYSTOLIC BLOOD PRESSURE: 114 MMHG

## 2021-05-16 VITALS
WEIGHT: 250.37 LBS | HEART RATE: 102 BPM | RESPIRATION RATE: 14 BRPM | HEIGHT: 66 IN | OXYGEN SATURATION: 98 % | BODY MASS INDEX: 40.24 KG/M2 | SYSTOLIC BLOOD PRESSURE: 122 MMHG | DIASTOLIC BLOOD PRESSURE: 82 MMHG | TEMPERATURE: 98.2 F

## 2021-05-16 VITALS
RESPIRATION RATE: 16 BRPM | OXYGEN SATURATION: 97 % | HEART RATE: 96 BPM | DIASTOLIC BLOOD PRESSURE: 78 MMHG | TEMPERATURE: 97.8 F | HEIGHT: 66 IN | BODY MASS INDEX: 40.82 KG/M2 | SYSTOLIC BLOOD PRESSURE: 114 MMHG | WEIGHT: 254 LBS

## 2021-05-16 VITALS
HEART RATE: 93 BPM | HEIGHT: 66 IN | DIASTOLIC BLOOD PRESSURE: 82 MMHG | WEIGHT: 252 LBS | TEMPERATURE: 96.9 F | BODY MASS INDEX: 40.5 KG/M2 | SYSTOLIC BLOOD PRESSURE: 112 MMHG | OXYGEN SATURATION: 98 % | RESPIRATION RATE: 16 BRPM

## 2021-05-16 VITALS
BODY MASS INDEX: 39.55 KG/M2 | HEART RATE: 93 BPM | OXYGEN SATURATION: 97 % | DIASTOLIC BLOOD PRESSURE: 80 MMHG | HEIGHT: 66 IN | TEMPERATURE: 96.4 F | WEIGHT: 246.12 LBS | SYSTOLIC BLOOD PRESSURE: 118 MMHG

## 2021-05-21 ENCOUNTER — HOSPITAL ENCOUNTER (OUTPATIENT)
Dept: OTHER | Facility: HOSPITAL | Age: 31
Discharge: HOME | End: 2021-05-21
Attending: INTERNAL MEDICINE

## 2021-05-21 ENCOUNTER — CONVERSION ENCOUNTER (OUTPATIENT)
Dept: INTERNAL MEDICINE | Facility: CLINIC | Age: 31
End: 2021-05-21

## 2021-05-21 ENCOUNTER — OFFICE VISIT CONVERTED (OUTPATIENT)
Dept: INTERNAL MEDICINE | Facility: CLINIC | Age: 31
End: 2021-05-21
Attending: INTERNAL MEDICINE

## 2021-05-21 LAB
ALBUMIN SERPL-MCNC: 4.7 G/DL (ref 3.5–5)
ALBUMIN/GLOB SERPL: 1.5 {RATIO} (ref 1.4–2.6)
ALP SERPL-CCNC: 78 U/L (ref 42–98)
ALT SERPL-CCNC: 22 U/L (ref 10–40)
ANION GAP SERPL CALC-SCNC: 18 MMOL/L (ref 8–19)
AST SERPL-CCNC: 24 U/L (ref 15–50)
BASOPHILS # BLD AUTO: 0.04 10*3/UL (ref 0–0.2)
BASOPHILS NFR BLD AUTO: 0.4 % (ref 0–3)
BILIRUB SERPL-MCNC: 0.35 MG/DL (ref 0.2–1.3)
BUN SERPL-MCNC: 13 MG/DL (ref 5–25)
BUN/CREAT SERPL: 11 {RATIO} (ref 6–20)
CALCIUM SERPL-MCNC: 9.3 MG/DL (ref 8.7–10.4)
CHLORIDE SERPL-SCNC: 99 MMOL/L (ref 99–111)
CHOLEST SERPL-MCNC: 195 MG/DL (ref 107–200)
CHOLEST/HDLC SERPL: 4.1 {RATIO} (ref 3–6)
CONV ABS IMM GRAN: 0.04 10*3/UL (ref 0–0.2)
CONV CO2: 21 MMOL/L (ref 22–32)
CONV IMMATURE GRAN: 0.4 % (ref 0–1.8)
CONV TOTAL PROTEIN: 7.8 G/DL (ref 6.3–8.2)
CREAT UR-MCNC: 1.23 MG/DL (ref 0.5–0.9)
DEPRECATED RDW RBC AUTO: 40.5 FL (ref 36.4–46.3)
EOSINOPHIL # BLD AUTO: 0.22 10*3/UL (ref 0–0.7)
EOSINOPHIL # BLD AUTO: 2.2 % (ref 0–7)
ERYTHROCYTE [DISTWIDTH] IN BLOOD BY AUTOMATED COUNT: 14.1 % (ref 11.7–14.4)
GFR SERPLBLD BASED ON 1.73 SQ M-ARVRAT: 58 ML/MIN/{1.73_M2}
GLOBULIN UR ELPH-MCNC: 3.1 G/DL (ref 2–3.5)
GLUCOSE SERPL-MCNC: 98 MG/DL (ref 65–99)
HCT VFR BLD AUTO: 38.2 % (ref 37–47)
HDLC SERPL-MCNC: 48 MG/DL (ref 40–60)
HGB BLD-MCNC: 11.8 G/DL (ref 12–16)
IRON SATN MFR SERPL: 9 % (ref 20–55)
IRON SERPL-MCNC: 47 UG/DL (ref 60–170)
LDLC SERPL CALC-MCNC: 125 MG/DL (ref 70–100)
LYMPHOCYTES # BLD AUTO: 3.49 10*3/UL (ref 1–5)
LYMPHOCYTES NFR BLD AUTO: 34.5 % (ref 20–45)
MCH RBC QN AUTO: 24.9 PG (ref 27–31)
MCHC RBC AUTO-ENTMCNC: 30.9 G/DL (ref 33–37)
MCV RBC AUTO: 80.8 FL (ref 81–99)
MONOCYTES # BLD AUTO: 0.66 10*3/UL (ref 0.2–1.2)
MONOCYTES NFR BLD AUTO: 6.5 % (ref 3–10)
NEUTROPHILS # BLD AUTO: 5.66 10*3/UL (ref 2–8)
NEUTROPHILS NFR BLD AUTO: 56 % (ref 30–85)
NRBC CBCN: 0 % (ref 0–0.7)
OSMOLALITY SERPL CALC.SUM OF ELEC: 278 MOSM/KG (ref 273–304)
PLATELET # BLD AUTO: 376 10*3/UL (ref 130–400)
PMV BLD AUTO: 9.5 FL (ref 9.4–12.3)
POTASSIUM SERPL-SCNC: 3.9 MMOL/L (ref 3.5–5.3)
RBC # BLD AUTO: 4.73 10*6/UL (ref 4.2–5.4)
SODIUM SERPL-SCNC: 134 MMOL/L (ref 135–147)
TIBC SERPL-MCNC: 495 UG/DL (ref 245–450)
TRANSFERRIN SERPL-MCNC: 346 MG/DL (ref 250–380)
TRIGL SERPL-MCNC: 110 MG/DL (ref 40–150)
VLDLC SERPL-MCNC: 22 MG/DL (ref 5–37)
WBC # BLD AUTO: 10.11 10*3/UL (ref 4.8–10.8)

## 2021-05-22 LAB
FOLATE SERPL-MCNC: 5.1 NG/ML (ref 4.8–20)
T4 FREE SERPL-MCNC: 1.5 NG/DL (ref 0.9–1.8)
TSH SERPL-ACNC: 0.99 M[IU]/L (ref 0.27–4.2)
VIT B12 SERPL-MCNC: 427 PG/ML (ref 211–911)

## 2021-05-24 LAB
CONV EBV EARLY ANTIGEN: 11 U/ML (ref 0–10.9)
CONV EBV NUCLEAR ANTIGEN: 165 U/ML (ref 0–21.9)
CONV EPSTEIN BARR VIRAL CAPSID IGM: <10 U/ML (ref 0–43.9)
CONV EPSTEIN BARR VIRUS ANTIBODY TO VIRAL CAPSID IGG: 104 U/ML (ref 0–21.9)

## 2021-06-05 NOTE — PROGRESS NOTES
"   Progress Note      Patient Name: Carina Sanchez   Patient ID: [id number]   Sex: Female   YOB: 1990    Primary Care Provider: Amber Byrne MD   Referring Provider: Amber Byrne MD    Visit Date: May 21, 2021    Provider: Amber Byrne MD   Location: [facility]   Location Address: [address] [place] [address], [address]  [place], [place]  [id number]   Location Phone: [phone]          Chief Complaint     follow up on depression       History Of Present Illness  Carina Sanchez is a 31 year old /White female who presents for evaluation and treatment of:      life is taking a downward slide  states that she stressed at work and there is fighting at home  she had a little \"mental breakdown\" at work  her apt with DR. Hull    htn-  running well at home  thinks it is a bit high today because she was running late  no chest pain  no trouble breathing  leg swelling    Got COVID vaccine    GERD-  stomach slightly upset from tiem to time         Past Medical History  Disease Name Date Onset Notes   Allergic rhinitis --  --    Anxiety --  --    Arthritis --  --    Bipolar Disorder --  --    Depression (emotion) --  --    GERD (gastroesophageal reflux disease) 04/13/2018 well controlled cont current meds   HTN (hypertension) --  --    Moderate episode of recurrent major depressive disorder 04/13/2018 Will increase lexapro to 10 mg. Will also increase wellbutrin to 150 mg BID, discussed risk of serotonin syndrome and signs and symptoms to look for with using lexapro and wellbutrin together. Discussed seeing a counselor to talk through her underlying social stressors, patient agrees to do so. Told patient that we will consider a referral to psychiatry at a later date if we are unable to get her depression under good control. Educated the patient on the importance of finding a career and goals that are beneficial to her mental health.          Past Surgical History  Procedure Name Date " Notes   Injection of keloid  Keloid removal both ears   [place] Tooth Extraction --  --          Medication List  Name Date Started Instructions   aripiprazole 5 mg oral tablet 2021 take 1 tablet (5 mg) by oral route once daily for 90 days   CeraVe topical cream 2020 apply to affected area(s) by topical route 2 times a day   Depo-Provera 150 mg/mL intramuscular suspension 2021 inject 1 milliliter (150 mg) by intramuscular route every 3 months   ferrous sulfate 324 mg (65 mg iron) oral tablet,delayed release (DR/EC) 2021 take 1 tablet by oral route 2 times a day for 90 days   lisinopril 10 mg oral tablet 2021 TAKE 1 TABLET BY MOUTH ONCE daily   naproxen 500 mg oral tablet 2021 take 1 tablet (500 mg) by oral route 2 times per day with food   omeprazole 40 mg oral capsule,delayed release(DR/EC) 2021 take 1 capsule (40 mg) by oral route once daily before a meal for 90 days   Trintellix 20 mg oral tablet 2021 TAKE 1 TABLET BY MOUTH EVERY DAY AT the same time each day.         Allergy List  Allergen Name Date Reaction Notes   Keflex --  Hives --          Family Medical History  Disease Name Relative/Age Notes   Family history of depression  --    Family history of anxiety disorder  --    Family history of substance abuse  --          Reproductive History  Menstrual   Last Menstrual Period: 2015   Pregnancy Summary   Total Pregnancies: 0 Full Term: 0 Premature: 0   Ab Induced: 0 Ab Spontaneous: 0 Ectopics: 0   Multiples: 0 Livin         Social History  Finding Status Start/Stop Quantity Notes   Alcohol Current some day --/-- --  2021 - 2020 - 2020 -    Recreational Drug Use --  --/-- --  2021 - 2020 -    Tobacco Current every day --/-- 1 PPD Hx 9 years of smoking         Immunizations  NameDate Admin Mfg Trade Name Lot Number Route Inj VIS Given VIS Publication   DEVAN Infante2021 NE Not Entered  NE NE 2021    Comments:  "   Putwklpgj11/18/2019 [facility] Fluzone Quadrivalent [id number] IM RD 10/18/2019    Comments: pt tolerated well and left office in stable condition, DESIREE Norman         Vitals  Date Time BP Position Site L\R Cuff Size HR RR TEMP (F) WT  HT  BMI kg/m2 BSA m2 O2 Sat FR L/min FiO2 HC       01/16/2020 10:46 /78 Sitting    107 - R  99.2 268lbs 2oz 5'  6\" 43.28 2.38 96 %      03/02/2020 05:08 /74 Sitting    100 - R  97.3 276lbs 2oz 5'  6\" 44.57 2.42 98 %  21%    11/17/2020 02:46 /91 Sitting    102 - R 20 96.6 265lbs 4oz 5'  6\" 42.81 2.37 99 %  21%    05/21/2021 04:06 /74 Sitting    118 - R  97.4 269lbs 8oz 5'  6\" 43.5 2.39 97 %  21%          Physical Examination  · Constitutional  o Appearance  o : obese, well developed  · Head and Face  o Head  o :   § Inspection  § : atraumatic, normocephalic  · Eyes  o Eyes  o : extraocular movements intact, no scleral icterus, no conjunctival injection  · Respiratory  o Respiratory Effort  o : breathing comfortably, symmetric chest rise  o Auscultation of Lungs  o : clear to asculatation bilaterally, no wheezes, rales, or rhonchii  · Cardiovascular  o Heart  o :   § Auscultation of Heart  § : regular rate and rhythm, no murmurs, rubs, or gallops  o Peripheral Vascular System  o :   § Extremities  § : no edema  · Neurologic  o Mental Status Examination  o :   § Orientation  § : grossly oriented to person, place and time  o Gait and Station  o :   § Gait Screening  § : normal gait  · Psychiatric  o General  o : normal mood and affect          Assessment  · GERD (gastroesophageal reflux disease)     530.81/K21.9  stable cont current meds  · Moderate episode of recurrent major depressive disorder     296.32/F33.1    cont current meds  cont to work with Dr. Hull  · HTN (hypertension)     401.9/I10  · Screening for lipid disorders     V77.91/Z13.220  · Allergic rhinitis due to allergen     477.9/J30.9  adjust meds  · Anxiety disorder     300.00/F41.9  · Essential " hypertension     401.9/I10  · Fatigue     780.79/R53.83  · Cystic acne     706.1/L70.0  · Snores     786.09/R06.83       will give cream for acne  order sleep study  check labs and adjust meds based on results     Problems Reconciled  Plan  · Orders  o Iron panel (iron, TIBC, transferrin saturation) (67723, 17165, 05761) - 780.79/R53.83 - 05/21/2021  o EBV antibody panel (68386, 66330, 75988) - 780.79/R53.83 - 05/21/2021  o Thyroid Profile (61475, 95007, THYII) - 780.79/R53.83 - 05/21/2021  o B12 Folate levels (B12FO) - 780.79/R53.83 - 05/21/2021  o CBC with Auto Diff [facility] (85992) - 530.81/K21.9, 401.9/I10, V77.91/Z13.220 - 05/21/2021  o CMP [facility] (07464) - 530.81/K21.9, 401.9/I10, V77.91/Z13.220 - 05/21/2021   [place]  o Lipid Panel [facility] (04681) - V77.91/Z13.220 - 05/21/2021  o ACO-39: Current medications updated and reviewed (1159F, ) - - 05/21/2021  o DERMATOLOGY CONSULTATION (DERMA) - 706.1/L70.0 - 05/21/2021  o Sleep Study (Basic Sleep Apnea) [facility] (06170) - 401.9/I10, 780.79/R53.83, 786.09/R06.83 - 05/21/2021  · Medications  o Xyzal 5 mg oral tablet   SIG: take 1 tablet (5 mg) by oral route once daily in the evening   DISP: (90) Tablet with 0 refills  Prescribed on 05/21/2021     o Dymista 137-50 mcg/spray nasal spray,non-aerosol   SIG: spray 1 spray in each nostril by intranasal route 2 times per day   DISP: (1) Inhaler with 0 refills  Prescribed on 05/21/2021     o Depo-Provera 150 mg/mL intramuscular suspension   SIG: inject 1 milliliter (150 mg) by intramuscular route every 3 months   DISP: (1) Vial with 0 refills  Refilled on 05/21/2021     o lisinopril 10 mg oral tablet   SIG: TAKE 1 TABLET BY MOUTH ONCE daily   DISP: (90) Tablet with 1 refills  Refilled on 05/21/2021     o naproxen 500 mg oral tablet   SIG: take 1 tablet (500 mg) by oral route 2 times per day with food   DISP: (90) Tablet with 1 refills  Refilled on 05/21/2021     o omeprazole 40 mg oral capsule,delayed release(DR/EC)   SIG: take 1  capsule (40 mg) by oral route once daily before a meal for 90 days   DISP: (90) Capsule with 1 refills  Refilled on 05/21/2021     o Trintellix 20 mg oral tablet   SIG: TAKE 1 TABLET BY MOUTH EVERY DAY AT the same time each day.   DISP: (90) Tablet with 1 refills  Refilled on 05/21/2021     o Allegra Allergy 180 mg oral tablet   SIG: take 1 tablet (180 mg) by oral route once daily for 90 days   DISP: (90) Tablet with 1 refills  Discontinued on 05/21/2021     o Flonase Allergy Relief 50 mcg/actuation nasal spray,suspension   SIG: spray 1 - 2 sprays (50 - 100 mcg) in each nostril by intranasal route once daily as needed   DISP: (1) Box with 2 refills  Discontinued on 05/21/2021     o Medications have been Reconciled  o Transition of Care or Provider Policy  · Instructions  o Patient was educated/instructed on their diagnosis, treatment and medications prior to discharge from the clinic today.  · Disposition  o 2 Month Follow Up            Electronically Signed by: Amber Byrne MD -Author on June 2, 2021 11:49:14 PM

## 2021-06-10 ENCOUNTER — TELEMEDICINE (OUTPATIENT)
Dept: PSYCHIATRY | Facility: CLINIC | Age: 31
End: 2021-06-10

## 2021-06-10 DIAGNOSIS — F32.A DEPRESSION, UNSPECIFIED DEPRESSION TYPE: Primary | ICD-10-CM

## 2021-06-10 PROBLEM — I10 HTN (HYPERTENSION): Status: ACTIVE | Noted: 2021-06-10

## 2021-06-10 PROBLEM — K21.9 GERD (GASTROESOPHAGEAL REFLUX DISEASE): Status: ACTIVE | Noted: 2018-04-13

## 2021-06-10 PROBLEM — F33.1 MODERATE EPISODE OF RECURRENT MAJOR DEPRESSIVE DISORDER: Status: ACTIVE | Noted: 2018-04-13

## 2021-06-10 PROBLEM — J30.9 ALLERGIC RHINITIS: Status: ACTIVE | Noted: 2021-06-10

## 2021-06-10 PROBLEM — M19.90 ARTHRITIS: Status: ACTIVE | Noted: 2021-06-10

## 2021-06-10 PROBLEM — F41.9 ANXIETY: Status: ACTIVE | Noted: 2021-06-10

## 2021-06-10 PROCEDURE — 99214 OFFICE O/P EST MOD 30 MIN: CPT | Performed by: STUDENT IN AN ORGANIZED HEALTH CARE EDUCATION/TRAINING PROGRAM

## 2021-06-10 RX ORDER — MEDROXYPROGESTERONE ACETATE 150 MG/ML
150 INJECTION, SUSPENSION INTRAMUSCULAR
COMMUNITY
End: 2021-09-01

## 2021-06-10 RX ORDER — NAPROXEN 500 MG/1
500 TABLET ORAL 2 TIMES DAILY WITH MEALS
COMMUNITY
End: 2022-03-25

## 2021-06-10 RX ORDER — LISINOPRIL 10 MG/1
10 TABLET ORAL DAILY
COMMUNITY
End: 2022-03-25 | Stop reason: SDUPTHER

## 2021-06-10 RX ORDER — ARIPIPRAZOLE 5 MG/1
5 TABLET ORAL DAILY
COMMUNITY
End: 2021-07-27

## 2021-06-10 RX ORDER — FERROUS SULFATE 324(65)MG
TABLET, DELAYED RELEASE (ENTERIC COATED) ORAL
COMMUNITY
Start: 2021-05-28 | End: 2022-03-25

## 2021-06-10 RX ORDER — LEVOCETIRIZINE DIHYDROCHLORIDE 5 MG/1
5 TABLET, FILM COATED ORAL EVERY EVENING
COMMUNITY
End: 2022-03-25

## 2021-06-10 RX ORDER — OMEPRAZOLE 20 MG/1
20 CAPSULE, DELAYED RELEASE ORAL DAILY
COMMUNITY
End: 2022-03-25

## 2021-06-10 RX ORDER — AZELASTINE HYDROCHLORIDE, FLUTICASONE PROPIONATE 137; 50 UG/1; UG/1
SPRAY, METERED NASAL 2 TIMES DAILY
COMMUNITY
End: 2022-03-25 | Stop reason: SDUPTHER

## 2021-06-10 NOTE — PROGRESS NOTES
"Subjective   Carina Sanchez is a 31 y.o. female who presents today for follow up    Chief Complaint:      History of Present Illness:     Carina Sanchez is a 31 year old /White female who presents to the office today referred by Amber Byrne MD.   Chart 11/17: Seen November 6. Feels like she is on a roller coaster. On Trintellix 20 mg a day, highest dose. BMI is 44. March: Creatinine 1.08. Otherwise utd&wnl. No EKG or head imaging. PHQ 9 November 17 of 16, jaclyn 7 is 14, almost severe.   Virtual visit via Zoom audio and video due to the COVID-19 pandemic. Patient is accepting of and agreeable to appointment. The appointment consisted of the patient and I only.     Interview: Not doing as well.  Notes continued depression.  States Abilify is not working, but notes that she is not compliant on this medication.  States she is taking it sometimes only 3 times a week if less.    Continued depression and anxiety symptoms.    Patient wonders if her symptoms could be treated with a stimulant.  States that when she was in fifth grade, she was started on Adderall for 2 weeks and it was discontinued because it \"did not do anything.\"  This history suggests that she may have been diagnosed with ADHD in the past.  Affirms present attentional symptoms, such as losing things, forgetting things, having trouble organizing tasks, interrupting people during speech, not being able to read or watch movies, drifting off during conversations.    No SI HI AVH.    PHQ-9 Depression Screening  PHQ-9 Total Score: 18    Little interest or pleasure in doing things? 3   Feeling down, depressed, or hopeless? 3   Trouble falling or staying asleep, or sleeping too much? 2   Feeling tired or having little energy? 1   Poor appetite or overeating? 3   Feeling bad about yourself - or that you are a failure or have let yourself or your family down? 3   Trouble concentrating on things, such as reading the newspaper or watching television? 3 "   Moving or speaking so slowly that other people could have noticed? Or the opposite - being so fidgety or restless that you have been moving around a lot more than usual? 0   Thoughts that you would be better off dead, or of hurting yourself in some way? 0   PHQ-9 Total Score 18       Past Surgical History:  Past Surgical History:   Procedure Laterality Date   • KELOID EXCISION  2000    INJECTION OF KELOID-KELOID REMOVAL BOTH EARS   • KELOID EXCISION     • WISDOM TOOTH EXTRACTION     • WISDOM TOOTH EXTRACTION         Problem List:  Patient Active Problem List   Diagnosis   • Allergic rhinitis   • Anxiety   • Arthritis   • Depression   • GERD (gastroesophageal reflux disease)   • HTN (hypertension)   • Moderate episode of recurrent major depressive disorder (CMS/HCC)       Allergy:   Allergies   Allergen Reactions   • Cephalexin Hives        Discontinued Medications:  There are no discontinued medications.    Current Medications:   Current Outpatient Medications   Medication Sig Dispense Refill   • ARIPiprazole (ABILIFY) 5 MG tablet Take 5 mg by mouth Daily.     • Azelastine-Fluticasone 137-50 MCG/ACT suspension into the nostril(s) as directed by provider 2 (two) times a day.     • Famotidine 20mg/5mL suspension famotidine oral take 1 tablet by oral route every day   Suspended     • ferrous sulfate 324 MG tablet delayed-release ferrous sulfate 324 mg (65 mg iron) oral tablet,delayed release (DR/EC) take 1 tablet by oral route 2 times a day for 90 days 5/28/2021  Active     • levocetirizine (XYZAL) 5 MG tablet Take 5 mg by mouth Every Evening.     • lisinopril (PRINIVIL,ZESTRIL) 10 MG tablet Take 10 mg by mouth Daily.     • medroxyPROGESTERone (DEPO-PROVERA) 150 MG/ML injection Inject 150 mg into the appropriate muscle as directed by prescriber Every 3 (Three) Months.     • naproxen (NAPROSYN) 500 MG tablet Take 500 mg by mouth 2 (Two) Times a Day With Meals.     • omeprazole (priLOSEC) 20 MG capsule Take 20 mg by  mouth Daily.     • Vortioxetine HBr 20 MG tablet Take  by mouth.       No current facility-administered medications for this visit.       Past Medical History:  Past Medical History:   Diagnosis Date   • Allergic rhinitis    • Anxiety    • Arthritis    • Bipolar disord, crnt episode manic w/o psych features, mild (CMS/HCC)    • Depression    • GERD (gastroesophageal reflux disease) 04/13/2018    WELL CONTROLLED CONT CURRENT MEDS   • HTN (hypertension)    • Moderate episode of recurrent major depressive disorder (CMS/HCC) 04/13/2018    WILL INCREASE LEXAPRO TO 10 MG. WILL ALSO INCREASE WELLBUTRIN  MG BID         Social History     Socioeconomic History   • Marital status: Single     Spouse name: Not on file   • Number of children: Not on file   • Years of education: Not on file   • Highest education level: Not on file   Tobacco Use   • Smoking status: Current Every Day Smoker     Packs/day: 1.00     Types: Cigarettes   • Smokeless tobacco: Never Used   Vaping Use   • Vaping Use: Some days   Substance and Sexual Activity   • Alcohol use: Yes     Comment: CURRRENT SOME DAY   • Drug use: Yes     Types: Marijuana   • Sexual activity: Not Currently         Family History   Problem Relation Age of Onset   • Depression Other    • Anxiety disorder Other    • Drug abuse Other        Mental Status Exam:   Hygiene:   good  Cooperation:  Cooperative  Eye Contact:  Good  Psychomotor Behavior:  Appropriate  Affect:  Full range euthymic, briefly tearful when discussing ADHD symptoms, mood in-congruent  Mood: depressed  Hopelessness: Denies  Speech:  Normal  Thought Process:  Linear  Thought Content:  Normal  Suicidal:  None  Homicidal:  None  Hallucinations:  None  Delusion:  None  Memory:  Intact  Orientation:  grossly intact  Reliability:  fair  Insight:  Fair  Judgement:  Fair to poor  Impulse Control:  Fair  Physical/Medical Issues:  No      Review of Systems:  Review of Systems   Constitutional: Negative for fatigue.    Eyes: Negative for visual disturbance.   Respiratory: Negative for cough and shortness of breath.    Cardiovascular: Negative for chest pain, palpitations and leg swelling.   Gastrointestinal: Negative for nausea and vomiting.   Endocrine: Negative for cold intolerance and heat intolerance.   Genitourinary: Negative for difficulty urinating.   Musculoskeletal: Negative for joint swelling.   Skin: Negative for wound.   Allergic/Immunologic: Negative for immunocompromised state.   Neurological: Negative for dizziness.   Hematological: Negative for adenopathy.         Physical Exam:  Physical Exam    Vital Signs:   There were no vitals taken for this visit.     Lab Results:   No visits with results within 6 Month(s) from this visit.   Latest known visit with results is:   No results found for any previous visit.       EKG Results:  No orders to display       Imaging Results:  No Images in the past 120 days found..      Assessment/Plan   Diagnoses and all orders for this visit:    1. Depression, unspecified depression type (Primary)        Presentation most consistent with cluster B personality disorder, very likely borderline.  Strongly suspect the patient's symptoms are largely due to personality, primarily due to the fluctuating nature of her symptoms; 2 months ago, she was doing very well, for instance.  Possible major depressive disorder, with anxious distress.    Patient is worse this time around.  I believe that we should add therapy and keep her meds the same. In fact, therapy should be the mainstay of treatment for her if this is a personality disorder.  I informed her of this and she voiced understanding and agreed to proceed.      She has also not been compliant on her medications. Continue medications without changes: Trintellix 20 mg a day, Abilify 5 mg a day.  Taking them as prescribed would lead to higher concentrations in her system, which will target more receptors, and if the combination is the right  "one, improve her symptoms.    Patient reports a compelling childhood history for ADHD.  Referral for neuropsychological testing.    See back in 2 months.     Patient will set up psychotherapy when she can have an in person visit initially, which will certainly be the case now.    S/P bupropion, which \"did nothing.\"    Visit Diagnoses:    ICD-10-CM ICD-9-CM   1. Depression, unspecified depression type  F32.9 311       PLAN:  1. Safety: No acute safety concerns  2. Therapy: Referral made   3. Risk Assessment: Risk of self-harm acutely is moderate to high. Risk factors include depressive disorder, anxiety disorder, personality disorder, access to weapons, history of self-harm, family history of suicide attempts, some AODA. Protective factors include no present SI, no history of suicide attempts, healthcare seeking, future orientation, willingness to engage in care, psychologically minded. Risk of self-harm chronically is also moderate to high, and could be further elevated in the event of treatment noncompliance and/or AODA.  4. Safety: No acute safety concerns.  5. Medications: CONTINUE Trintellix 20 mg p.o. daily. CONTINUE Abilify 5 mg PO QDAY to target depression, anxiety, decreased energy. Risks, benefits, alternatives discussed with patient including increased energy, exacerbation of irritability, akathisia, GI upset, orthostatic hypotension. After discussion of these risks and benefits, the patient voiced understanding and agreed to proceed.  6. Therapy: Recommended; referral made. Patient desires to have at least the initial visit in person, and during the pandemic she may have difficulty finding a clinic that will accommodate this wish.  7. Follow Up: 2 mos.      TREATMENT PLAN/GOALS: Continue supportive psychotherapy efforts and medications as indicated. Treatment and medication options discussed during today's visit. Patient ackowledged and verbally consented to continue with current treatment plan and was " educated on the importance of compliance with treatment and follow-up appointments.    MEDICATION ISSUES:  MARVIN reviewed as expected.  Discussed medication options and treatment plan of prescribed medication as well as the risks, benefits, and side effects including potential falls, possible impaired driving and metabolic adversities among others. Patient is agreeable to call the office with any worsening of symptoms or onset of side effects. Patient is agreeable to call 911 or go to the nearest ER should he/she begin having SI/HI. No medication side effects or related complaints today.     MEDS ORDERED DURING VISIT:  No orders of the defined types were placed in this encounter.      Return in about 2 months (around 8/10/2021).         This document has been electronically signed by Mihaela Hull MD  Lolis 10, 2021 13:38 EDT      Part of this note may be an electronic transcription/translation of spoken language to printed text using the Dragon Dictation System.

## 2021-06-10 NOTE — PATIENT INSTRUCTIONS
1.  Please return to clinic at your next scheduled visit.  Contact the clinic ([phone]) at least 24 hours prior in the event you need to cancel.  2.  Do no harm to yourself or others.    3.  Avoid alcohol and drugs.    4.  Take all medications as prescribed.  Please contact the clinic with any concerns. If you are in need of medication refills, please call the clinic at [phone].    5. Should you want to get in touch with your provider, Dr. Mhiaela Hull, please utilize [employer] or contact the office ([phone]), and staff will be able to page Dr. Hull directly.  6.  In the event you have personal crisis, contact the following crisis numbers: Suicide Prevention Hotline 1-838.778.5564; [facility] Helpline 5-828-509-MKIC; [facility] Emergency Room 074-508-4279; text HELLO to 612274; or 489.     SPECIFIC RECOMMENDATIONS:     1.      Medications discussed at this encounter:                   - no changes to trintellix or abilify     2.      Psychotherapy recommendations: start     3.     Return to clinic: 8 weeks

## 2021-07-15 VITALS
HEIGHT: 66 IN | TEMPERATURE: 97.4 F | BODY MASS INDEX: 43.31 KG/M2 | OXYGEN SATURATION: 97 % | DIASTOLIC BLOOD PRESSURE: 74 MMHG | HEART RATE: 118 BPM | WEIGHT: 269.5 LBS | SYSTOLIC BLOOD PRESSURE: 132 MMHG

## 2021-07-27 ENCOUNTER — TELEMEDICINE (OUTPATIENT)
Dept: PSYCHIATRY | Facility: CLINIC | Age: 31
End: 2021-07-27

## 2021-07-27 DIAGNOSIS — F33.1 MAJOR DEPRESSIVE DISORDER, RECURRENT EPISODE, MODERATE (HCC): Primary | ICD-10-CM

## 2021-07-27 DIAGNOSIS — F60.9 CLUSTER B PERSONALITY DISORDER IN ADULT (HCC): ICD-10-CM

## 2021-07-27 PROCEDURE — 99214 OFFICE O/P EST MOD 30 MIN: CPT | Performed by: STUDENT IN AN ORGANIZED HEALTH CARE EDUCATION/TRAINING PROGRAM

## 2021-07-27 RX ORDER — FLUTICASONE PROPIONATE 50 MCG
SPRAY, SUSPENSION (ML) NASAL
COMMUNITY
Start: 2021-05-21 | End: 2022-03-25

## 2021-07-27 RX ORDER — OMEPRAZOLE 40 MG/1
40 CAPSULE, DELAYED RELEASE ORAL DAILY
COMMUNITY
Start: 2021-05-21 | End: 2021-07-27

## 2021-07-27 RX ORDER — TRAZODONE HYDROCHLORIDE 50 MG/1
50 TABLET ORAL NIGHTLY
Qty: 60 TABLET | Refills: 2 | Status: SHIPPED | OUTPATIENT
Start: 2021-07-27 | End: 2022-02-08

## 2021-07-27 RX ORDER — LAMOTRIGINE 25 MG/1
TABLET ORAL
Qty: 46 TABLET | Refills: 1 | Status: SHIPPED | OUTPATIENT
Start: 2021-07-27 | End: 2021-09-09 | Stop reason: SDUPTHER

## 2021-07-27 NOTE — PATIENT INSTRUCTIONS
1.  Please return to clinic at your next scheduled visit.  Contact the clinic ([phone]) at least 24 hours prior in the event you need to cancel.  2.  Do no harm to yourself or others.    3.  Avoid alcohol and drugs.    4.  Take all medications as prescribed.  Please contact the clinic with any concerns. If you are in need of medication refills, please call the clinic at [phone].    5. Should you want to get in touch with your provider, Dr. Mihaela Hull, please utilize [email or web address] or contact the office ([phone]), and staff will be able to page Dr. Hull directly.  6.  In the event you have personal crisis, contact the following crisis numbers: Suicide Prevention Hotline 1-339.992.3060; [facility] Helpline 4-777-733-CRTR; [facility] Emergency Room 251-708-2842; text HELLO to 581774; or 626.     SPECIFIC RECOMMENDATIONS:     1.      Medications discussed at this encounter:                   - Discontinue abilify. Reduce trintellix to half a pill daily for 3 days, then a quarter pill daily for 3 days then stop. Start lamictal.     2.      Psychotherapy recommendations: Referral made     3.     Return to clinic: 6 weeks

## 2021-07-27 NOTE — PROGRESS NOTES
"Subjective   Carina Sanchez is a 31 y.o. female who presents today for follow up    Chief Complaint:      History of Present Illness:     Carina Sanchez is a 31 year old /White female who presents to the office today referred by Amber Byrne MD.   Chart 11/17: Seen November 6. Feels like she is on a roller coaster. On Trintellix 20 mg a day, highest dose. BMI is 44. March: Creatinine 1.08. Otherwise utd&wnl. No EKG or head imaging. PHQ 9 November 17 of 16, jaclyn 7 is 14, almost severe.   Virtual visit via Zoom audio and video due to the COVID-19 pandemic. Patient is accepting of and agreeable to appointment. The appointment consisted of the patient and I only.     Interview: \"I'm hanging in there.\"  1. Abilify not working. Compliant on it.  2. Mom wanted her to talk to me. Highs and lows are extreme now.  3. Having outbursts, such as when she dropped her sandwich on the floor.   4. Continued depression and anxiety symptoms. Irritability, restlessness, depressed mood, energy low, concentration low.  5. Not sleeping at night.  6. Interested in virtual therapy. Has not set up therapy or ADHD.  7. No SI HI AVH. No self-harm.    Previous:  Patient wonders if her symptoms could be treated with a stimulant.  States that when she was in fifth grade, she was started on Adderall for 2 weeks and it was discontinued because it \"did not do anything.\"  This history suggests that she may have been diagnosed with ADHD in the past.  Affirms present attentional symptoms, such as losing things, forgetting things, having trouble organizing tasks, interrupting people during speech, not being able to read or watch movies, drifting off during conversations.      Past Psychiatric History:  Began Psychiatric Treatment: For about the last 2 years   Dx: Depression and anxiety   Psychiatrist: Primary care provider   Therapist: Tried therapy once via Zoom, and did not like it. Some willingness to try again, if she can meet " "face-to-face with her therapist for the initial visit.   Admissions History: Denies   Medication Trials: Trintellix, Zoloft, Cymbalta, Lexapro, Concerta, Wellbutrin. None of these medications have helped.   Self-Harm: Patient used to cut her shoulders bilaterally in high school to \"feel something.\" Patient also reports it \"gave me control over something.\"   Suicide Attempts: Denies     Substance Abuse History:  Types: Smokes 6 cigarettes a day, no desire to quit. Vapes cannabis through 1 pod a month. Social alcohol. Denies all else, including illicit.     Social History:  Marital Status: Single   Employed: Yes   Employer: [employer]   Kids: Denies   House: Lives in her mom and stepwinston's house    Hx: Denies     Family History:  Suicide Attempts: Paternal grandfather shot himself, her mom's brother shot himself, and her maternal cousin shot himself 4 years ago. This latter is one of the reasons why the patient \"began her spiral.\"   Suicide Completions: All of the above were completed   Substance Use: Addiction is on both sides of the family   Psychiatric Conditions: \"A lot\" of psychiatry issues. States one of her family members is diagnosed with bipolar.     Developmental History:  Born: [place]   Siblings: Deferred   Childhood: No abuse. Raised by her grandparents until she was 7 years old, then her mother took over taking care of her.   High School: Completed   College: Some       PHQ-9 Depression Screening  PHQ-9 Total Score:      Little interest or pleasure in doing things?     Feeling down, depressed, or hopeless?     Trouble falling or staying asleep, or sleeping too much?     Feeling tired or having little energy?     Poor appetite or overeating?     Feeling bad about yourself - or that you are a failure or have let yourself or your family down?     Trouble concentrating on things, such as reading the newspaper or watching television?     Moving or speaking so slowly that other people could have " noticed? Or the opposite - being so fidgety or restless that you have been moving around a lot more than usual?     Thoughts that you would be better off dead, or of hurting yourself in some way?     PHQ-9 Total Score         Past Surgical History:  Past Surgical History:   Procedure Laterality Date   • KELOID EXCISION  2000    INJECTION OF KELOID-KELOID REMOVAL BOTH EARS   • KELOID EXCISION     • WISDOM TOOTH EXTRACTION     • WISDOM TOOTH EXTRACTION         Problem List:  Patient Active Problem List   Diagnosis   • Allergic rhinitis   • Anxiety   • Arthritis   • Depression   • GERD (gastroesophageal reflux disease)   • HTN (hypertension)   • Moderate episode of recurrent major depressive disorder (CMS/HCC)       Allergy:   Allergies   Allergen Reactions   • Cephalexin Hives        Discontinued Medications:  Medications Discontinued During This Encounter   Medication Reason   • Famotidine 20mg/5mL suspension *Therapy completed   • omeprazole (priLOSEC) 40 MG capsule *Re-Entry   • ARIPiprazole (ABILIFY) 5 MG tablet Not Efficacious   • Vortioxetine HBr 20 MG tablet Not Efficacious       Current Medications:   Current Outpatient Medications   Medication Sig Dispense Refill   • Azelastine-Fluticasone 137-50 MCG/ACT suspension into the nostril(s) as directed by provider 2 (two) times a day.     • ferrous sulfate 324 MG tablet delayed-release ferrous sulfate 324 mg (65 mg iron) oral tablet,delayed release (DR/EC) take 1 tablet by oral route 2 times a day for 90 days 5/28/2021  Active     • fluticasone (FLONASE) 50 MCG/ACT nasal spray spry 1 or 2 sprays IN EACH NOSTRIL once DAILY AS NEEDED     • levocetirizine (XYZAL) 5 MG tablet Take 5 mg by mouth Every Evening.     • lisinopril (PRINIVIL,ZESTRIL) 10 MG tablet Take 10 mg by mouth Daily.     • medroxyPROGESTERone (DEPO-PROVERA) 150 MG/ML injection Inject 150 mg into the appropriate muscle as directed by prescriber Every 3 (Three) Months.     • naproxen (NAPROSYN) 500 MG  "tablet Take 500 mg by mouth 2 (Two) Times a Day With Meals.     • omeprazole (priLOSEC) 20 MG capsule Take 20 mg by mouth Daily.     • lamoTRIgine (LaMICtal) 25 MG tablet 1 tab (25 mg) PO daily for 14 days, then 2 tab PO daily (50 mg) thereafter 46 tablet 1   • traZODone (DESYREL) 50 MG tablet Take 1 tablet by mouth Every Night for 180 days. 60 tablet 2     No current facility-administered medications for this visit.       Past Medical History:  Past Medical History:   Diagnosis Date   • Allergic rhinitis    • Anxiety    • Arthritis    • Bipolar disord, crnt episode manic w/o psych features, mild (CMS/HCC)    • Depression    • GERD (gastroesophageal reflux disease) 04/13/2018    WELL CONTROLLED CONT CURRENT MEDS   • HTN (hypertension)    • Moderate episode of recurrent major depressive disorder (CMS/HCC) 04/13/2018    WILL INCREASE LEXAPRO TO 10 MG. WILL ALSO INCREASE WELLBUTRIN  MG BID         Social History     Socioeconomic History   • Marital status: Single     Spouse name: Not on file   • Number of children: Not on file   • Years of education: Not on file   • Highest education level: Not on file   Tobacco Use   • Smoking status: Current Every Day Smoker     Packs/day: 1.00     Types: Cigarettes   • Smokeless tobacco: Never Used   Vaping Use   • Vaping Use: Some days   Substance and Sexual Activity   • Alcohol use: Yes     Comment: CURRRENT SOME DAY   • Drug use: Yes     Types: Marijuana   • Sexual activity: Not Currently         Family History   Problem Relation Age of Onset   • Depression Other    • Anxiety disorder Other    • Drug abuse Other        Mental Status Exam:   Hygiene:   good  Cooperation:  Cooperative  Eye Contact:  Good  Psychomotor Behavior:  Appropriate  Affect:  Full range euthymic  Mood: \"no change\"  Hopelessness: Denies  Speech:  Normal  Thought Process:  Linear  Thought Content:  Normal  Suicidal:  None  Homicidal:  None  Hallucinations:  None  Delusion:  None  Memory:  " Intact  Orientation:  grossly intact  Reliability:  fair  Insight:  Fair  Judgement:  Fair to poor  Impulse Control:  Fair  Physical/Medical Issues:  No      Review of Systems:  Review of Systems   Constitutional: Positive for diaphoresis. Negative for fatigue.   HENT: Negative for drooling.    Eyes: Negative for visual disturbance.   Respiratory: Negative for cough and shortness of breath.    Cardiovascular: Negative for chest pain, palpitations and leg swelling.   Gastrointestinal: Negative for nausea and vomiting.   Endocrine: Positive for cold intolerance and heat intolerance.   Genitourinary: Negative for difficulty urinating.   Musculoskeletal: Negative for joint swelling.   Skin: Negative for wound.   Allergic/Immunologic: Negative for immunocompromised state.   Neurological: Negative for dizziness, seizures, speech difficulty and numbness.   Hematological: Negative for adenopathy.         Physical Exam:  Physical Exam    Vital Signs:   There were no vitals taken for this visit.     Lab Results:   No visits with results within 6 Month(s) from this visit.   Latest known visit with results is:   No results found for any previous visit.       EKG Results:  No orders to display       Imaging Results:  No Images in the past 120 days found..      Assessment/Plan   Diagnoses and all orders for this visit:    1. Major depressive disorder, recurrent episode, moderate (CMS/HCC) (Primary)  -     Ambulatory Referral to Behavioral Health    2. Cluster B personality disorder in adult (CMS/[facility])  -     Ambulatory Referral to Behavioral Health    Other orders  -     traZODone (DESYREL) 50 MG tablet; Take 1 tablet by mouth Every Night for 180 days.  Dispense: 60 tablet; Refill: 2  -     lamoTRIgine (LaMICtal) 25 MG tablet; 1 tab (25 mg) PO daily for 14 days, then 2 tab PO daily (50 mg) thereafter  Dispense: 46 tablet; Refill: 1        Presentation most consistent with cluster B personality disorder, very likely borderline.   "Strongly suspect the patient's symptoms are largely due to personality, primarily due to the fluctuating nature of her symptoms; 2 months ago, she was doing very well, for instance.  Possible major depressive disorder, with anxious distress.      No change.  Discontinue Trintellix and Abilify.  Start Lamictal and trazodone.    See back in 6 weeks.    Referral to psychotherapy at [facility].  Patient understands there is a 2-month wait and that it is virtual.  I think she would be a very good candidate for therapy given her cluster B personality disorder.    S/P bupropion, abilify, trintellix, all which \"did nothing.\"    Visit Diagnoses:    ICD-10-CM ICD-9-CM   1. Major depressive disorder, recurrent episode, moderate (CMS/[facility])  F33.1 296.32   2. Cluster B personality disorder in adult (CMS/[facility])  F60.9 301.89       PLAN:  1. Safety: No acute safety concerns  2. Therapy: Referral made   3. Risk Assessment: Risk of self-harm acutely is moderate to high. Risk factors include depressive disorder, anxiety disorder, personality disorder, access to weapons, history of self-harm, family history of suicide attempts, some AODA. Protective factors include no present SI, no history of suicide attempts, healthcare seeking, future orientation, willingness to engage in care, psychologically minded. Risk of self-harm chronically is also moderate to high, and could be further elevated in the event of treatment noncompliance and/or AODA.  4. Safety: No acute safety concerns.  5. Medications:   a. START trazodone 50 mg p.o. nightly. Risks, benefits, side effects discussed with patient including GI upset, sedation, dizziness/falls risk, grogginess the following day, prolongation of the QTc interval.  After discussion of these risks and benefits, the patient voiced understanding and agreed to proceed.    b. START Lamictal 25 mg p.o. daily for 14 days, then increase to 50 mg p.o. daily thereafter. Risks, benefits, alternatives discussed with " patient including rash, rebound depressive or manic symptoms if prompt discontinuation, GI upset, agitation, sedation.  After discussion of these risks and benefits, patient voiced understanding and agreed to proceed.  c. REDUCE Trintellix 20 to 10 mg p.o. daily for 3 days, then 5 mg p.o. daily for 3 days, then discontinue.   d. DISCONTINUE Abilify 5 mg PO QDAY.  6. Therapy: Recommended; referral made to [facility].   7. Follow Up: 6 wks.      TREATMENT PLAN/GOALS: Continue supportive psychotherapy efforts and medications as indicated. Treatment and medication options discussed during today's visit. Patient ackowledged and verbally consented to continue with current treatment plan and was educated on the importance of compliance with treatment and follow-up appointments.    MEDICATION ISSUES:  MARVIN reviewed as expected.  Discussed medication options and treatment plan of prescribed medication as well as the risks, benefits, and side effects including potential falls, possible impaired driving and metabolic adversities among others. Patient is agreeable to call the office with any worsening of symptoms or onset of side effects. Patient is agreeable to call 911 or go to the nearest ER should he/she begin having SI/HI. No medication side effects or related complaints today.     MEDS ORDERED DURING VISIT:  New Medications Ordered This Visit   Medications   • traZODone (DESYREL) 50 MG tablet     Sig: Take 1 tablet by mouth Every Night for 180 days.     Dispense:  60 tablet     Refill:  2   • lamoTRIgine (LaMICtal) 25 MG tablet     Si tab (25 mg) PO daily for 14 days, then 2 tab PO daily (50 mg) thereafter     Dispense:  46 tablet     Refill:  1       Return in about 6 weeks (around 2021).         This document has been electronically signed by Mihaela Hull MD  2021 13:15 EDT      Part of this note may be an electronic transcription/translation of spoken language to printed text using the Dragon Dictation  System.

## 2021-08-21 ENCOUNTER — PATIENT MESSAGE (OUTPATIENT)
Dept: INTERNAL MEDICINE | Facility: CLINIC | Age: 31
End: 2021-08-21

## 2021-09-01 RX ORDER — MEDROXYPROGESTERONE ACETATE 150 MG/ML
INJECTION, SUSPENSION INTRAMUSCULAR
Qty: 1 ML | Refills: 0 | Status: SHIPPED | OUTPATIENT
Start: 2021-09-01 | End: 2022-03-25

## 2021-09-01 NOTE — TELEPHONE ENCOUNTER
Caller: NEW, LEDA    Relationship: Mother    Best call back number: [phone]    Medication needed:   Requested Prescriptions     Pending Prescriptions Disp Refills   • medroxyPROGESTERone (DEPO-PROVERA) 150 MG/ML injection [Pharmacy Med Name: medroxyprogesterone 150 mg/mL intramuscular suspension] 1 mL 0     Sig: inject 1 ml INTRAMUSCULARLY once every 3 months       When do you need the refill by: ASAP    What additional details did the patient provide when requesting the medication: PATIENT IS OUT OF MEDICATION    Does the patient have less than a 3 day supply:  [x] Yes  [] No    What is the patient's preferred pharmacy: [facility] [place] - [id number] [phone] [facility] [phone] [facility]

## 2021-09-06 DIAGNOSIS — F33.1 MAJOR DEPRESSIVE DISORDER, RECURRENT EPISODE, MODERATE (HCC): Primary | ICD-10-CM

## 2021-09-07 RX ORDER — OMEPRAZOLE 40 MG/1
40 CAPSULE, DELAYED RELEASE ORAL DAILY
COMMUNITY
Start: 2021-08-31 | End: 2022-03-25 | Stop reason: SDUPTHER

## 2021-09-09 RX ORDER — LAMOTRIGINE 25 MG/1
50 TABLET ORAL DAILY
Qty: 60 TABLET | Refills: 2 | Status: SHIPPED | OUTPATIENT
Start: 2021-09-09 | End: 2022-02-08

## 2021-09-09 RX ORDER — LAMOTRIGINE 25 MG/1
50 TABLET ORAL DAILY
Qty: 60 TABLET | Refills: 2 | OUTPATIENT
Start: 2021-09-09

## 2021-09-13 DIAGNOSIS — M54.50 LOW BACK PAIN, UNSPECIFIED BACK PAIN LATERALITY, UNSPECIFIED CHRONICITY, UNSPECIFIED WHETHER SCIATICA PRESENT: ICD-10-CM

## 2021-09-13 DIAGNOSIS — M54.16 LUMBAR RADICULOPATHY: Primary | ICD-10-CM

## 2021-12-01 ENCOUNTER — TELEPHONE (OUTPATIENT)
Dept: INTERNAL MEDICINE | Facility: CLINIC | Age: 31
End: 2021-12-01

## 2021-12-01 NOTE — TELEPHONE ENCOUNTER
Attempted to reach patient today and yesterday about paper work for disability. No voicemail set up. Paper work in [place] pending folder.

## 2022-01-21 ENCOUNTER — TELEMEDICINE (OUTPATIENT)
Dept: PSYCHIATRY | Facility: CLINIC | Age: 32
End: 2022-01-21

## 2022-01-21 DIAGNOSIS — F41.1 GENERALIZED ANXIETY DISORDER: Primary | ICD-10-CM

## 2022-01-21 DIAGNOSIS — F33.1 MODERATE EPISODE OF RECURRENT MAJOR DEPRESSIVE DISORDER: ICD-10-CM

## 2022-01-21 PROCEDURE — 90791 PSYCH DIAGNOSTIC EVALUATION: CPT | Performed by: COUNSELOR
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
## 2022-01-24 ENCOUNTER — TELEPHONE (OUTPATIENT)
Dept: PSYCHIATRY | Facility: CLINIC | Age: 32
End: 2022-01-24

## 2022-02-06 ENCOUNTER — HOSPITAL ENCOUNTER (OUTPATIENT)
Dept: MRI IMAGING | Facility: HOSPITAL | Age: 32
Discharge: HOME | End: 2022-02-06
Admitting: INTERNAL MEDICINE

## 2022-02-06 DIAGNOSIS — M54.16 LUMBAR RADICULOPATHY: ICD-10-CM

## 2022-02-06 DIAGNOSIS — M54.50 LOW BACK PAIN, UNSPECIFIED BACK PAIN LATERALITY, UNSPECIFIED CHRONICITY, UNSPECIFIED WHETHER SCIATICA PRESENT: ICD-10-CM

## 2022-02-06 PROCEDURE — 72148 MRI LUMBAR SPINE W/O DYE: CPT

## 2022-02-08 ENCOUNTER — TELEMEDICINE (OUTPATIENT)
Dept: PSYCHIATRY | Facility: CLINIC | Age: 32
End: 2022-02-08

## 2022-02-08 DIAGNOSIS — F41.1 GENERALIZED ANXIETY DISORDER: ICD-10-CM

## 2022-02-08 DIAGNOSIS — F33.1 MODERATE EPISODE OF RECURRENT MAJOR DEPRESSIVE DISORDER: Primary | ICD-10-CM

## 2022-02-08 PROCEDURE — 90792 PSYCH DIAG EVAL W/MED SRVCS: CPT | Performed by: NURSE PRACTITIONER

## 2022-02-08 RX ORDER — VENLAFAXINE HYDROCHLORIDE 37.5 MG/1
37.5 CAPSULE, EXTENDED RELEASE ORAL DAILY
Qty: 30 CAPSULE | Refills: 0 | Status: SHIPPED | OUTPATIENT
Start: 2022-02-08 | End: 2022-03-25 | Stop reason: SDUPTHER

## 2022-02-08 NOTE — PROGRESS NOTES
This provider is located at [facility], [address] [place] [address].The Patient is seen remotely at home, [address] [place] [address] Dr. Mirella Walls [place] [address] via [employer].  Patient is being seen via telehealth and confirm that they are in a secure environment for this session. The patient's condition being diagnosed/treated is appropriate for telemedicine. The provider identified himself/herself: herself as well as her credentials.   The patient gave consent to be seen remotely, and when consent is given they understand that the consent allows for patient identifiable information to be sent to a third party as needed.   They may refuse to be seen remotely at any time. The electronic data is encrypted and password protected, and the patient has been advised of the potential risks to privacy not withstanding such measures.    You have chosen to receive care through a telehealth visit.  Do you consent to use a video/audio connection for your medical care today? Yes. Patient verified Name, , and address.       Chief Complaint  Depression/axiety as well as focus and attention     Subjective          Carina Sanchez presents to [facility] for an initial appointment after being referred by Marilu CORNELIUS [facility] for medication management.    History of Present Illness  Patient presents today for initial appointment after being referred by the therapist Marilu Reynolds for medication management.  According to patient and past records she saw psychiatrist Dr. Raman kim previously but notes that due to losing her insurance did not follow back up with him as records had previously noted multiple attempts reaching out to the patient.  Patient notes that she did not feel comfortable following back up.  Patient states that she has always dealt with depression and anxiety but things became worse in July after she lost her job of 11 years.  Patient states that her  anxiety and depression have been increased lately.  Patient states however she is in the process of getting another job but it has been difficult.  Patient states that she wants to explore her ADHD that she had in her childhood.  She expresses that she was told by Dr. Hull to get testing and she believes she still has that paperwork but due to losing her job and insurance she did not follow through.  Patient notes that she has been on sertraline, duloxetine, Lexapro, Trintellix, bupropion and Concerta as well as lamotrigine and trazodone.   Patient notes at 14 she started having suicidal thoughts and self-harm until the age of 19. The patient reports depressive symptoms including depressed mood, crying spells, insomnia, feelings of guilt, feelings of hopelessness, feelings of helplessness, low energy, difficulty concentrating and suicidal ideation, and have caused impairment in important areas of functioning.  Depression rated 7/10 with 10 being the worst.   The patient reports the following symptoms of anxiety: constant anxiety/worry, restlessness/on edge, difficulty concentrating, irritability and sleep disturbance and have caused impairment in important areas of functioning.  Patient states that she has more hopeless helpless feelings it would be better off that she were not here and not so much suicidal ideation but denies any plan or intent.  Patient reports her appetite is good.  Patient denies any hypomanic or manic episodes.  Patient states that living with her mother and stepfather have been difficult as she helps with all daily activities but it is still difficult as she states they are overbearing at times.  Patient states that she is a big list maker and planner however notes she is feeling not as organized and becoming more easily distracted.  She notes that she is also been more forgetful lately.  She denied any major issues with her job as she notes the symptoms have just been previously reoccurring.   Patient denies any current SI/HI/AH was VH.  She also notes that she has not been taking any medication since September 2021.        Social History     Socioeconomic History   • Marital status: Single   Tobacco Use   • Smoking status: Current Every Day Smoker     Packs/day: 0.50     Years: 15.00     Pack years: 7.50     Types: Cigarettes   • Smokeless tobacco: Never Used   Vaping Use   • Vaping Use: Some days   • Substances: Nicotine   • Devices: Disposable   Substance and Sexual Activity   • Alcohol use: Yes     Comment: sean    • Drug use: Yes     Types: Marijuana   • Sexual activity: Not Currently     Birth control/protection: None     Past Medical History:   Diagnosis Date   • Allergic rhinitis    • Anxiety    • Arthritis    • Bipolar disord, crnt episode manic w/o psych features, mild (HCC)    • Depression    • GERD (gastroesophageal reflux disease) 04/13/2018    WELL CONTROLLED CONT CURRENT MEDS   • HTN (hypertension)    • Moderate episode of recurrent major depressive disorder (HCC) 04/13/2018    WILL INCREASE LEXAPRO TO 10 MG. WILL ALSO INCREASE WELLBUTRIN  MG BID         Objective   Vital Signs:   There were no vitals taken for this visit.  Due to the remote nature of this encounter (virtual encounter), vitals were unable to be obtained.  Height stated at 66 inches.  Weight stated at 269 pounds.        PHQ-9 Score:   PHQ-9 Total Score: (P) 13     Patient screened positive for depression based on a PHQ-9 score of 13 on 2/8/2022. Follow-up recommendations include: see notes and medication list.    PHQ-9 Depression Screening  Little interest or pleasure in doing things? (P) 2   Feeling down, depressed, or hopeless? (P) 3   Trouble falling or staying asleep, or sleeping too much? (P) 1   Feeling tired or having little energy? (P) 1   Poor appetite or overeating? (P) 1   Feeling bad about yourself - or that you are a failure or have let yourself or your family down? (P) 3   Trouble concentrating on  things, such as reading the newspaper or watching television? (P) 1   Moving or speaking so slowly that other people could have noticed? Or the opposite - being so fidgety or restless that you have been moving around a lot more than usual? (P) 0   Thoughts that you would be better off dead, or of hurting yourself in some way? (P) 1   PHQ-9 Total Score (P) 13   If you checked off any problems, how difficult have these problems made it for you to do your work, take care of things at home, or get along with other people? (P) Somewhat difficult     PHQ-9 Total Score: (P) 13      Feeling nervous, anxious or on edge: (P) Nearly every day  Not being able to stop or control worrying: (P) Several days  Worrying too much about different things: (P) Nearly every day  Trouble Relaxing: (P) More than half the days  Being so restless that it is hard to sit still: (P) Several days  Feeling afraid as if something awful might happen: (P) Nearly every day  Becoming easily annoyed or irritable: (P) Nearly every day  DIPIKA 7 Total Score: (P) 16  If you checked any problems, how difficult have these problems made it for you to do your work, take care of things at home, or get along with other people: (P) Somewhat difficult      PROMIS scale screening tool that patient filled out virtually reviewed by this APRN at today's encounter.      Mental Status Exam:   Hygiene:   good  Cooperation:  Cooperative  Eye Contact:  Good  Psychomotor Behavior:  Restless  Affect:  Appropriate  Mood: depressed and anxious  Speech:  Normal  Thought Process:  Goal directed  Thought Content:  Normal  Suicidal:  None  Homicidal:  None  Hallucinations:  None  Delusion:  None  Memory:  Intact  Orientation:  Person, Place, Time and Situation  Reliability:  good  Insight:  Good and Fair  Judgement:  Good and Fair  Impulse Control:  Good and Fair  Physical/Medical Issues:  Yes HTN and arthritis     Current Medications:   Current Outpatient Medications   Medication  Sig Dispense Refill   • Azelastine-Fluticasone 137-50 MCG/ACT suspension into the nostril(s) as directed by provider 2 (two) times a day.     • fluticasone (FLONASE) 50 MCG/ACT nasal spray spry 1 or 2 sprays IN EACH NOSTRIL once DAILY AS NEEDED     • lisinopril (PRINIVIL,ZESTRIL) 10 MG tablet Take 10 mg by mouth Daily.     • omeprazole (priLOSEC) 40 MG capsule Take 40 mg by mouth Daily. before a meal     • ferrous sulfate 324 MG tablet delayed-release ferrous sulfate 324 mg (65 mg iron) oral tablet,delayed release (DR/EC) take 1 tablet by oral route 2 times a day for 90 days 5/28/2021  Active     • levocetirizine (XYZAL) 5 MG tablet Take 5 mg by mouth Every Evening.     • medroxyPROGESTERone (DEPO-PROVERA) 150 MG/ML injection inject 1 ml INTRAMUSCULARLY once every 3 months 1 mL 0   • naproxen (NAPROSYN) 500 MG tablet Take 500 mg by mouth 2 (Two) Times a Day With Meals.     • omeprazole (priLOSEC) 20 MG capsule Take 20 mg by mouth Daily.     • venlafaxine XR (Effexor XR) 37.5 MG 24 hr capsule Take 1 capsule by mouth Daily. 30 capsule 0     No current facility-administered medications for this visit.       Physical Exam  Nursing note reviewed.   Constitutional:       Appearance: Normal appearance.   Neurological:      Mental Status: She is alert.   Psychiatric:         Attention and Perception: Attention and perception normal.         Mood and Affect: Mood is anxious and depressed.         Speech: Speech is rapid and pressured.         Behavior: Behavior is cooperative.         Cognition and Memory: Cognition and memory normal.        Result Review :     The following data was reviewed by: MARNIE Valdivia on 02/08/2022:  Common labs    Common Labsle 5/21/21 5/21/21 5/21/21    1624 1624 1624   Glucose  98    BUN  13    Creatinine  1.23 (A)    Sodium  134 (A)    Potassium  3.9    Chloride  99    Calcium  9.3    Albumin  4.7    Total Bilirubin  0.35    Alkaline Phosphatase  78    AST (SGOT)  24    ALT (SGPT)  22     WBC 10.11     Hemoglobin 11.8 (A)     Hematocrit 38.2     Platelets 376     Total Cholesterol   195   Triglycerides   110   HDL Cholesterol   48   LDL Cholesterol    125 (A)   (A) Abnormal value       Comments are available for some flowsheets but are not being displayed.           CMP    CMP 5/21/21   Glucose 98   BUN 13   Creatinine 1.23 (A)   Sodium 134 (A)   Potassium 3.9   Chloride 99   Calcium 9.3   Albumin 4.7   Total Bilirubin 0.35   Alkaline Phosphatase 78   AST (SGOT) 24   ALT (SGPT) 22   (A) Abnormal value            CBC    CBC 5/21/21   WBC 10.11   RBC 4.73   Hemoglobin 11.8 (A)   Hematocrit 38.2   MCV 80.8 (A)   MCH 24.9 (A)   MCHC 30.9 (A)   RDW 14.1   Platelets 376   (A) Abnormal value            CBC w/diff    CBC w/Diff 5/21/21   WBC 10.11   RBC 4.73   Hemoglobin 11.8 (A)   Hematocrit 38.2   MCV 80.8 (A)   MCH 24.9 (A)   MCHC 30.9 (A)   RDW 14.1   Platelets 376   Neutrophil Rel % 56.0   Lymphocyte Rel % 34.5   Monocyte Rel % 6.5   Eosinophil Rel % 2.2   Basophil Rel % 0.4   (A) Abnormal value            Lipid Panel    Lipid Panel 5/21/21   Total Cholesterol 195   Triglycerides 110   HDL Cholesterol 48   VLDL Cholesterol 22   LDL Cholesterol  125 (A)   (A) Abnormal value       Comments are available for some flowsheets but are not being displayed.           TSH    TSH 5/21/21   TSH 0.993           Electrolytes    Electrolytes 5/21/21   Sodium 134 (A)   Potassium 3.9   Chloride 99   Calcium 9.3   (A) Abnormal value            Renal Profile    Renal Profile 5/21/21   BUN 13   Creatinine 1.23 (A)   (A) Abnormal value            BMP    BMP 5/21/21   BUN 13   Creatinine 1.23 (A)   Sodium 134 (A)   Potassium 3.9   Chloride 99   CO2 21 (A)   Calcium 9.3   (A) Abnormal value                    Data reviewed: PCP and past MD and therapy notes        Assessment and Plan    Problem List Items Addressed This Visit        Mental Health    Moderate episode of recurrent major depressive disorder (HCC) - Primary     Relevant Medications    venlafaxine XR (Effexor XR) 37.5 MG 24 hr capsule      Other Visit Diagnoses     Generalized anxiety disorder        Relevant Medications    venlafaxine XR (Effexor XR) 37.5 MG 24 hr capsule            TREATMENT PLAN/GOALS: Continue supportive psychotherapy efforts and medications as indicated. Treatment and medication options discussed during today's visit. Patient ackowledged and verbally consented to continue with current treatment plan and was educated on the importance of compliance with treatment and follow-up appointments.    MEDICATION ISSUES:  We discussed risks, benefits, and side effects of the above medications and the patient was agreeable with the plan. Patient was educated on the importance of compliance with treatment and follow-up appointments.  Patient is agreeable to call the office with any worsening of symptoms or onset of side effects. Patient is agreeable to call 911 or go to the nearest ER should he/she begin having SI/HI.      -Encourage patient to discontinue Lamictal as she states she already has that she has not been taking any medicines in September 2021.  -Begin venlafaxine 37.5 mg XR daily for depression and anxiety symptoms.  Highly encourage patient if she had any worsening symptoms or concerns contact clinic for sooner appointment she verbalized understanding.  -Patient given number for psychological testing and encouraged as per Dr. Hull's request to obtain ADHD testing.  -Encourage continued psychotherapy.  -Courage patient that we would also assess her sleep at her next visit.    Counseled patient regarding multimodal approach with healthy nutrition, healthy sleep, regular physical activity, social activities, counseling, and medications.      Coping skills reviewed and encouraged positive framing of thoughts     Assisted patient in processing above session content; acknowledged and normalized patient’s thoughts, feelings, and concerns.  Applied  positive  coping skills and behavior management in session.  Allowed patient to freely discuss issues without interruption or judgment. Provided safe, confidential environment to facilitate the development of positive therapeutic relationship and encourage open, honest communication. Assisted patient in identifying risk factors which would indicate the need for higher level of care including thoughts to harm self or others and/or self-harming behavior and encouraged patient to contact this office, call 911, or present to the nearest emergency room should any of these events occur. Discussed crisis intervention services and means to access.     MEDS ORDERED DURING VISIT:  New Medications Ordered This Visit   Medications   • venlafaxine XR (Effexor XR) 37.5 MG 24 hr capsule     Sig: Take 1 capsule by mouth Daily.     Dispense:  30 capsule     Refill:  0           Follow Up   Return in about 4 weeks (around 3/8/2022), or if symptoms worsen or fail to improve, for Recheck.    Patient was given instructions and counseling regarding her condition or for health maintenance advice. Please see specific information pulled into the AVS if appropriate.     Some of the data in this electronic note has been brought forward from a previous encounter, any necessary changes have been made, it has been reviewed by this APRN, and it is accurate.    This document has been electronically signed by MARNIE Valdivia  February 8, 2022 16:27 EST    Part of this note may be an electronic transcription/translation of spoken language to printed text using the Dragon Dictation System.

## 2022-03-24 ENCOUNTER — TELEMEDICINE (OUTPATIENT)
Dept: PSYCHIATRY | Facility: CLINIC | Age: 32
End: 2022-03-24

## 2022-03-24 DIAGNOSIS — F33.1 MODERATE EPISODE OF RECURRENT MAJOR DEPRESSIVE DISORDER: ICD-10-CM

## 2022-03-24 DIAGNOSIS — F41.1 GENERALIZED ANXIETY DISORDER: Primary | ICD-10-CM

## 2022-03-24 PROCEDURE — 90837 PSYTX W PT 60 MINUTES: CPT | Performed by: COUNSELOR

## 2022-03-24 NOTE — PROGRESS NOTES
Date: March 27, 2022  Time In: 12:30 PM  Time Out: 1:26 PM  This provider is located at the [facility] (through [facility]), [address], [place] [address] using a secure [employer] Video Visit through [employer]. Patient is being seen remotely via telehealth at home address in [place] and stated they are in a secure environment for this session. The patient's condition being diagnosed/treated is appropriate for telemedicine. The provider identified herself as well as her credentials. The patient, and/or patients guardian, consent to be seen remotely, and when consent is given they understand that the consent allows for patient identifiable information to be sent to a third party as needed. They may refuse to be seen remotely at any time. The electronic data is encrypted and password protected, and the patient and/or guardian has been advised of the potential risks to privacy not withstanding such measures.     You have chosen to receive care through a telehealth visit.  Do you consent to use a video/audio connection for your medical care today? Yes    PROGRESS NOTE  Data:  Carina Sanchez is a 32 y.o. female who presents today for follow up and care planning. 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 Patient states that her ex moved back to [place] a month ago and is in the process of  and he came back around and they have been seeing each other again. Patient states that she can see changes in her ex and in herself and things are looking up for them and patient states that he has been a big help for her in a mental health way. Patient states that she is very happy now but is on guard for the other shoe to drop and change it. Patient wishes to work on decreasing her feelings of anxiety and depression and improving her self-esteem and  learning to love herself. Patient states that she struggles with self sabotage and not feeling worthy of respect and love and has realized that she is not getting out of relationships what she puts into them.    Chief Complaint: feelings of depression and anxiety, issues with self love    History of Present Illness: Patient has a lengthy history of anxiety and depression      Clinical Maneuvering/Intervention: care planning and catch up with patient     (Scales based on 0 - 10 with 10 being the worst)  Depression: 3-4 Anxiety: 3       Assisted patient in developing a care plan to address current needs and concerns and set baselines for the identified problems on the care plan; processing above session content; acknowledged and normalized patient’s thoughts, feelings, and concerns.  Rationalized patient thought process regarding her current needs.  Discussed triggers associated with patient's feeling of anxiety and depression such as not being valued, being a last minute thought, and giving more than she gets in relationships.  Also discussed coping skills for patient to implement such as maintaining boundaries with others, self-care, and slowing herself down a bit to focus on what is important to her.    Allowed patient to freely discuss issues without interruption or judgment. Provided safe, confidential environment to facilitate the development of positive therapeutic relationship and encourage open, honest communication. Assisted patient in identifying risk factors which would indicate the need for higher level of care including thoughts to harm self or others and/or self-harming behavior and encouraged patient to contact this office, call 911, or present to the nearest emergency room should any of these events occur. Discussed crisis intervention services and means to access. Patient adamantly and convincingly denies current suicidal or homicidal ideation or perceptual disturbance.    Assessment:   Assessment    Patient was able to work with therapist in order to develop a care plan to address needs and concerns and set baselines for the identified problems on the care plan. Patient struggles with feelings of anxiety and depression which continues to cause impairment in important areas of functioning.  A result, they can be reasonably expected to continue to benefit from treatment and would likely be at increased risk for decompensation otherwise.    Mental Status Exam:   Hygiene:   good  Cooperation:  Cooperative  Eye Contact:  Good  Psychomotor Behavior:  Appropriate  Affect:  Appropriate  Mood: euthymic  Speech:  Normal  Thought Process:  Goal directed  Thought Content:  Normal  Suicidal:  None  Homicidal:  None  Hallucinations:  None  Delusion:  None  Memory:  Intact  Orientation:  Person, Place, Time and Situation  Reliability:  good  Insight:  Good  Judgement:  Good  Impulse Control:  Good  Physical/Medical Issues:  Yes lost voice for a month and a half       Patient's Support Network Includes:  significant other, extended family and friend    Functional Status: Moderate impairment     Progress toward goal: Not at goal; goals were established today with the development of the care plan     Prognosis: Good with Ongoing Treatment          Plan:    Patient will continue in individual outpatient therapy with focus on improved functioning and coping skills, maintaining stability, and avoiding decompensation and the need for higher level of care.    Patient will adhere to medication regimen as prescribed and report any side effects. Patient will contact this office, call 911 or present to the nearest emergency room should suicidal or homicidal ideations occur. Provide Cognitive Behavioral Therapy and Solution Focused Therapy to improve functioning, maintain stability, and avoid decompensation and the need for higher level of care.     Return in about 6 weeks, or earlier if symptoms worsen or fail to improve.            VISIT DIAGNOSIS:     ICD-10-CM ICD-9-CM   1. Generalized anxiety disorder  F41.1 300.02   2. Moderate episode of recurrent major depressive disorder (HCC)  F33.1 296.32             This document has been electronically signed by RADHA Johnson  March 27, 2022 22:10 EDT      Part of this note may be an electronic transcription/translation of spoken language to printed text using the Dragon Dictation System.

## 2022-03-25 ENCOUNTER — OFFICE VISIT (OUTPATIENT)
Dept: INTERNAL MEDICINE | Facility: CLINIC | Age: 32
End: 2022-03-25

## 2022-03-25 VITALS
OXYGEN SATURATION: 96 % | BODY MASS INDEX: 43.39 KG/M2 | HEIGHT: 66 IN | SYSTOLIC BLOOD PRESSURE: 122 MMHG | TEMPERATURE: 97.7 F | WEIGHT: 270 LBS | HEART RATE: 86 BPM | DIASTOLIC BLOOD PRESSURE: 78 MMHG | RESPIRATION RATE: 18 BRPM

## 2022-03-25 DIAGNOSIS — F33.1 MODERATE EPISODE OF RECURRENT MAJOR DEPRESSIVE DISORDER: Primary | ICD-10-CM

## 2022-03-25 DIAGNOSIS — F41.1 GENERALIZED ANXIETY DISORDER: ICD-10-CM

## 2022-03-25 DIAGNOSIS — Z13.220 SCREENING, LIPID: ICD-10-CM

## 2022-03-25 DIAGNOSIS — Z30.9 ENCOUNTER FOR CONTRACEPTIVE MANAGEMENT, UNSPECIFIED TYPE: ICD-10-CM

## 2022-03-25 DIAGNOSIS — I10 HYPERTENSION, UNSPECIFIED TYPE: ICD-10-CM

## 2022-03-25 DIAGNOSIS — K21.9 GASTROESOPHAGEAL REFLUX DISEASE, UNSPECIFIED WHETHER ESOPHAGITIS PRESENT: ICD-10-CM

## 2022-03-25 DIAGNOSIS — Z11.59 NEED FOR HEPATITIS C SCREENING TEST: ICD-10-CM

## 2022-03-25 LAB
B-HCG UR QL: NEGATIVE
EXPIRATION DATE: NORMAL
INTERNAL NEGATIVE CONTROL: NEGATIVE
INTERNAL POSITIVE CONTROL: POSITIVE
Lab: NORMAL

## 2022-03-25 PROCEDURE — 96372 THER/PROPH/DIAG INJ SC/IM: CPT | Performed by: INTERNAL MEDICINE

## 2022-03-25 PROCEDURE — 81025 URINE PREGNANCY TEST: CPT | Performed by: INTERNAL MEDICINE

## 2022-03-25 PROCEDURE — 99214 OFFICE O/P EST MOD 30 MIN: CPT | Performed by: INTERNAL MEDICINE

## 2022-03-25 RX ORDER — AZELASTINE HYDROCHLORIDE, FLUTICASONE PROPIONATE 137; 50 UG/1; UG/1
2 SPRAY, METERED NASAL DAILY
Qty: 23 G | Refills: 0 | Status: SHIPPED | OUTPATIENT
Start: 2022-03-25 | End: 2022-06-13 | Stop reason: SDUPTHER

## 2022-03-25 RX ORDER — VENLAFAXINE HYDROCHLORIDE 75 MG/1
75 CAPSULE, EXTENDED RELEASE ORAL DAILY
Qty: 90 CAPSULE | Refills: 1 | Status: SHIPPED | OUTPATIENT
Start: 2022-03-25 | End: 2022-06-01 | Stop reason: SDUPTHER

## 2022-03-25 RX ORDER — LISINOPRIL 10 MG/1
10 TABLET ORAL DAILY
Qty: 90 TABLET | Refills: 1 | Status: SHIPPED | OUTPATIENT
Start: 2022-03-25 | End: 2022-10-11 | Stop reason: SDUPTHER

## 2022-03-25 RX ORDER — MEDROXYPROGESTERONE ACETATE 150 MG/ML
150 INJECTION, SUSPENSION INTRAMUSCULAR ONCE
Status: COMPLETED | OUTPATIENT
Start: 2022-03-25 | End: 2022-03-25

## 2022-03-25 RX ORDER — OMEPRAZOLE 40 MG/1
40 CAPSULE, DELAYED RELEASE ORAL DAILY
Qty: 90 CAPSULE | Refills: 1 | Status: SHIPPED | OUTPATIENT
Start: 2022-03-25 | End: 2022-10-11 | Stop reason: SDUPTHER

## 2022-03-25 RX ADMIN — MEDROXYPROGESTERONE ACETATE 150 MG: 150 INJECTION, SUSPENSION INTRAMUSCULAR at 17:09

## 2022-03-25 NOTE — PROGRESS NOTES
"Chief Complaint  Follow up for depression    Subjective          Carina Sanchez presents to [facility]  History of Present Illness     States that she lost her job at [employer]  States that she lost it because she told him that  She recently got back in a new relationship with someone she dated in the past   This has been extremely beneficial  She just start therapy she has been to two sessions now  She is doing the [facility] telehealth therapy sessions    She has been feeling really good for the last two weeks     effexor has helped with her irritability  She doesn't think that it helped with her depression at all    States that she isn't sure job wise.    GERd-  Taking more consistently than she is flaring every so often    HTN-  Well controlled, cont current meds  No chest pain, no trouble breathing    LMP was 3/20  Has been sexually active  Wants to start depo again  Doesn't want any STD testing at this time    Objective   Vital Signs:   /78   Pulse 86   Temp 97.7 °F (36.5 °C) (Temporal)   Resp 18   Ht 167.6 cm (66\")   Wt 122 kg (270 lb)   SpO2 96%   BMI 43.58 kg/m²     Physical Exam  Vitals reviewed.   Constitutional:       Appearance: Normal appearance. She is well-developed. She is obese.   HENT:      Head: Normocephalic and atraumatic.      Right Ear: External ear normal.      Left Ear: External ear normal.   Eyes:      Conjunctiva/sclera: Conjunctivae normal.      Pupils: Pupils are equal, round, and reactive to light.   Cardiovascular:      Rate and Rhythm: Normal rate and regular rhythm.      Heart sounds: No murmur heard.    No friction rub. No gallop.   Pulmonary:      Effort: Pulmonary effort is normal.      Breath sounds: Normal breath sounds. No wheezing or rhonchi.   Skin:     General: Skin is warm and dry.   Neurological:      Mental Status: She is alert and oriented to person, place, and time.      Cranial Nerves: No cranial nerve " deficit.   Psychiatric:         Mood and Affect: Affect normal.         Behavior: Behavior normal.         Thought Content: Thought content normal.        Result Review :       Common labs    Common Labsle 5/21/21 5/21/21 5/21/21    1624 1624 1624   Glucose  98    BUN  13    Creatinine  1.23 (A)    Sodium  134 (A)    Potassium  3.9    Chloride  99    Calcium  9.3    Albumin  4.7    Total Bilirubin  0.35    Alkaline Phosphatase  78    AST (SGOT)  24    ALT (SGPT)  22    WBC 10.11     Hemoglobin 11.8 (A)     Hematocrit 38.2     Platelets 376     Total Cholesterol   195   Triglycerides   110   HDL Cholesterol   48   LDL Cholesterol    125 (A)   (A) Abnormal value       Comments are available for some flowsheets but are not being displayed.             Results for orders placed or performed in visit on 03/25/22   POC Pregnancy, Urine    Specimen: Urine   Result Value Ref Range    HCG, Urine, QL Negative Negative    Lot Number [id number]     Internal Positive Control Positive Positive, Passed    Internal Negative Control Negative Negative, Passed    Expiration Date 05/31/23             Procedures        Assessment and Plan    Diagnoses and all orders for this visit:    1. Moderate episode of recurrent major depressive disorder (HCC) (Primary)  Comments:   Appears to be doing much better however will increase effexor  Orders:  -     venlafaxine XR (Effexor XR) 75 MG 24 hr capsule; Take 1 capsule by mouth Daily.  Dispense: 90 capsule; Refill: 1  -     Comprehensive Metabolic Panel  -     CBC & Differential  -     TSH  -     Lipid Panel    2. Generalized anxiety disorder  Comments:  continue counseling  Orders:  -     venlafaxine XR (Effexor XR) 75 MG 24 hr capsule; Take 1 capsule by mouth Daily.  Dispense: 90 capsule; Refill: 1  -     Comprehensive Metabolic Panel  -     CBC & Differential  -     TSH  -     Lipid Panel    3. Need for hepatitis C screening test  -     Hepatitis C Antibody; Future    4. Screening, lipid  -      Lipid Panel    5. Encounter for contraceptive management, unspecified type  Comments:  depo today  Orders:  -     medroxyPROGESTERone (DEPO-PROVERA) injection 150 mg  -     POC Pregnancy, Urine    6. Hypertension, unspecified type  Comments:  Well-controlled continue current meds    7. Gastroesophageal reflux disease, unspecified whether esophagitis present  Comments:  Well-controlled continue current meds    Other orders  -     omeprazole (priLOSEC) 40 MG capsule; Take 1 capsule by mouth Daily. before a meal  Dispense: 90 capsule; Refill: 1  -     lisinopril (PRINIVIL,ZESTRIL) 10 MG tablet; Take 1 tablet by mouth Daily.  Dispense: 90 tablet; Refill: 1  -     Azelastine-Fluticasone 137-50 MCG/ACT suspension; 2 puffs into the nostril(s) as directed by provider Daily.  Dispense: 23 g; Refill: 0                  Follow Up   Return in about 2 months (around 5/25/2022).  Patient was given instructions and counseling regarding her condition or for health maintenance advice. Please see specific information pulled into the AVS if appropriate.

## 2022-03-29 ENCOUNTER — PRIOR AUTHORIZATION (OUTPATIENT)
Dept: INTERNAL MEDICINE | Facility: CLINIC | Age: 32
End: 2022-03-29

## 2022-03-29 NOTE — TELEPHONE ENCOUNTER
Pa started for dymista    Possible alternatives that do not require a PA  Azelastine HCl    Not Required

## 2022-03-30 NOTE — TELEPHONE ENCOUNTER
"Pa denied    \" The member had at least 1 month trial and therapeutic failure [drug did not work], allergy, contraindication [harmful for] (including potential drug-drug interactions with other medications) or intolerance [side effect] to 2 preferred agents in any sub-class, unless otherwise specified: azelastine 0.1% nasal spray, azelastine 0.15% nasal spray, ipratropium nasal spray, fluticasone propionate nasal spray.Your doctor requested azelastine-fluticasone nasal spray. We do not have records or chart notes showing you tried TWO of the preferred medications listed above for at least 1 month.\"  "

## 2022-03-30 NOTE — TELEPHONE ENCOUNTER
Please tell her insurance does not want to cover the nasal spray that we had originally tried to do I am okay with just sending the azelastine nasal spray if she is okay with this.  The one we wanted to do was a combination of the azelastine with Flonase she can just use both separately.  Flonase is over-the-counter but if she prefers for us to send it and that is fine.

## 2022-03-30 NOTE — TELEPHONE ENCOUNTER
Sounds good please let her know I sent in the Chantix and tell her I said good luck I think she can totally do it.

## 2022-03-30 NOTE — TELEPHONE ENCOUNTER
Pt is ok with just doing flonase and allegra as prescriptions if [facility] would sent [facility] [place]. She also wanted to ask about possibly getting chantix if that is an option for her.

## 2022-03-31 ENCOUNTER — TELEPHONE (OUTPATIENT)
Dept: INTERNAL MEDICINE | Facility: CLINIC | Age: 32
End: 2022-03-31

## 2022-04-01 ENCOUNTER — PRIOR AUTHORIZATION (OUTPATIENT)
Dept: INTERNAL MEDICINE | Facility: CLINIC | Age: 32
End: 2022-04-01

## 2022-04-07 ENCOUNTER — TELEPHONE (OUTPATIENT)
Dept: INTERNAL MEDICINE | Facility: CLINIC | Age: 32
End: 2022-04-07

## 2022-04-07 NOTE — TELEPHONE ENCOUNTER
Red rule verified and correct.    Pt is at [facility] @ [facility] and they do not see her orders.    Will fax labs.

## 2022-05-02 ENCOUNTER — TELEMEDICINE (OUTPATIENT)
Dept: PSYCHIATRY | Facility: CLINIC | Age: 32
End: 2022-05-02

## 2022-05-02 DIAGNOSIS — F33.1 MODERATE EPISODE OF RECURRENT MAJOR DEPRESSIVE DISORDER: Primary | ICD-10-CM

## 2022-05-02 DIAGNOSIS — F60.9 CLUSTER B PERSONALITY DISORDER IN ADULT: ICD-10-CM

## 2022-05-02 DIAGNOSIS — F41.1 GENERALIZED ANXIETY DISORDER: ICD-10-CM

## 2022-05-02 PROCEDURE — 99214 OFFICE O/P EST MOD 30 MIN: CPT | Performed by: NURSE PRACTITIONER

## 2022-05-02 NOTE — PROGRESS NOTES
This provider is located at [facility], [address] [place] [address].The Patient is seen remotely at home, [address] [place] [address] Dr. Mirella [place] [address] via [employer].  Patient is being seen via telehealth and confirm that they are in a secure environment for this session. The patient's condition being diagnosed/treated is appropriate for telemedicine. The provider identified himself/herself: herself as well as her credentials.   The patient gave consent to be seen remotely, and when consent is given they understand that the consent allows for patient identifiable information to be sent to a third party as needed.   They may refuse to be seen remotely at any time. The electronic data is encrypted and password protected, and the patient has been advised of the potential risks to privacy not withstanding such measures.    You have chosen to receive care through a telehealth visit.  Do you consent to use a video/audio connection for your medical care today? Yes. Patient verified Name, , and address.       Chief Complaint  Depression and anxiety     Subjective          Carina Sanchez presents to [facility] for medication management.     History of Present Illness  Patient presents today following up after she missed her last appointment and then I had to cancel previously.  Patient states that the job did not work out due to her mood.  Patient notes however she was doing very well as her ex of 12 years who came back into her life despite him going through a divorce she states things were really well for a month and then the past few weeks things have been gotten worse.  She states that he had gotten back with his ex-wife and had not contacted her which was very difficult.  She states that she lives right beside of his brother and sister-in-law so they have been helpful through the process.  She states that she goes for a job interview today which will be  helpful but notes things have been difficult.  She states in March she followed up with her PCP as they increased her Effexor to 75 as she found it helpful but now she is still having difficulty.  See PHQ-9 and DIPIKA-7.  Patient states that on Easter she had suicidal thoughts increase and had thought of a plan but no intent.  Patient states that it went away quickly when her ex's brother texted her something funny as she was grateful for that distraction.  Patient feels the medication has been helpful however due to this difficulty she feels it needs to be increased according to the patient.  See PHQ-9 and DIPIKA-7.  Patient reports her appetite has been good.  Patient states that she has felt more down and lack of motivation and hopelessness and helplessness and constant worry at times.  She reports that she is getting roughly 4 hours of sleep sometimes 5 hours.  She states that her nightmares are starting to get better and she has noticed her sleep improving slightly.  Patient denied any side effects to the medications.  Denies any current SI/HI/AH/VH.      Objective   Vital Signs:   There were no vitals taken for this visit.  Due to the remote nature of this encounter (virtual encounter), vitals were unable to be obtained.  Height stated at 66 inches.  Weight stated at 270 pounds.        PHQ-9 Score:   PHQ-9 Total Score: (P) 8     Patient screened positive for depression based on a PHQ-9 score of  on . Follow-up recommendations include: see notes and medication list.    PHQ-9 Depression Screening  Little interest or pleasure in doing things? (P) 1   Feeling down, depressed, or hopeless? (P) 1   Trouble falling or staying asleep, or sleeping too much? (P) 1   Feeling tired or having little energy? (P) 1   Poor appetite or overeating? (P) 0   Feeling bad about yourself - or that you are a failure or have let yourself or your family down? (P) 1   Trouble concentrating on things, such as reading the newspaper or watching  television? (P) 2   Moving or speaking so slowly that other people could have noticed? Or the opposite - being so fidgety or restless that you have been moving around a lot more than usual? (P) 0   Thoughts that you would be better off dead, or of hurting yourself in some way? (P) 1   PHQ-9 Total Score (P) 8   If you checked off any problems, how difficult have these problems made it for you to do your work, take care of things at home, or get along with other people? (P) Somewhat difficult     PHQ-9 Total Score: (P) 8      Feeling nervous, anxious or on edge: (P) More than half the days  Not being able to stop or control worrying: (P) Several days  Worrying too much about different things: (P) More than half the days  Trouble Relaxing: (P) Not at all  Being so restless that it is hard to sit still: (P) Not at all  Feeling afraid as if something awful might happen: (P) Several days  Becoming easily annoyed or irritable: (P) More than half the days  DIPIKA 7 Total Score: (P) 8  If you checked any problems, how difficult have these problems made it for you to do your work, take care of things at home, or get along with other people: (P) Somewhat difficult      PROMIS scale screening tool that patient filled out virtually reviewed by this APRN at today's encounter.      Mental Status Exam:   Hygiene:   good  Cooperation:  Cooperative  Eye Contact:  Good  Psychomotor Behavior:  Restless  Affect:  Appropriate  Mood: depressed and anxious  Speech:  Normal and Rapid  Thought Process:  Goal directed  Thought Content:  Normal  Suicidal:  None  Homicidal:  None  Hallucinations:  None  Delusion:  None  Memory:  Intact  Orientation:  Person, Place, Time and Situation  Reliability:  fair  Insight:  Fair  Judgement:  Fair  Impulse Control:  Fair  Physical/Medical Issues:  Yes see problem list     Current Medications:   Current Outpatient Medications   Medication Sig Dispense Refill   • Azelastine-Fluticasone 137-50 MCG/ACT  suspension 2 puffs into the nostril(s) as directed by provider Daily. 23 g 0   • lisinopril (PRINIVIL,ZESTRIL) 10 MG tablet Take 1 tablet by mouth Daily. 90 tablet 1   • omeprazole (priLOSEC) 40 MG capsule Take 1 capsule by mouth Daily. before a meal 90 capsule 1   • venlafaxine XR (Effexor XR) 75 MG 24 hr capsule Take 1 capsule by mouth Daily. 90 capsule 1     No current facility-administered medications for this visit.       Physical Exam  Nursing note reviewed.   Constitutional:       Appearance: Normal appearance.   Neurological:      Mental Status: She is alert.   Psychiatric:         Attention and Perception: Attention and perception normal.         Mood and Affect: Mood is anxious and depressed.         Speech: Speech normal.         Behavior: Behavior is cooperative.         Cognition and Memory: Cognition and memory normal.         Judgment: Judgment is impulsive.        Result Review :     The following data was reviewed by: MARNIE Valdivia on 05/02/2022:  Common labs    Common Labsle 5/21/21 5/21/21 5/21/21    1624 1624 1624   Glucose  98    BUN  13    Creatinine  1.23 (A)    Sodium  134 (A)    Potassium  3.9    Chloride  99    Calcium  9.3    Albumin  4.7    Total Bilirubin  0.35    Alkaline Phosphatase  78    AST (SGOT)  24    ALT (SGPT)  22    WBC 10.11     Hemoglobin 11.8 (A)     Hematocrit 38.2     Platelets 376     Total Cholesterol   195   Triglycerides   110   HDL Cholesterol   48   LDL Cholesterol    125 (A)   (A) Abnormal value       Comments are available for some flowsheets but are not being displayed.           CMP    CMP 5/21/21   Glucose 98   BUN 13   Creatinine 1.23 (A)   Sodium 134 (A)   Potassium 3.9   Chloride 99   Calcium 9.3   Albumin 4.7   Total Bilirubin 0.35   Alkaline Phosphatase 78   AST (SGOT) 24   ALT (SGPT) 22   (A) Abnormal value            CBC    CBC 5/21/21   WBC 10.11   RBC 4.73   Hemoglobin 11.8 (A)   Hematocrit 38.2   MCV 80.8 (A)   MCH 24.9 (A)   MCHC 30.9 (A)    RDW 14.1   Platelets 376   (A) Abnormal value            CBC w/diff    CBC w/Diff 5/21/21   WBC 10.11   RBC 4.73   Hemoglobin 11.8 (A)   Hematocrit 38.2   MCV 80.8 (A)   MCH 24.9 (A)   MCHC 30.9 (A)   RDW 14.1   Platelets 376   Neutrophil Rel % 56.0   Lymphocyte Rel % 34.5   Monocyte Rel % 6.5   Eosinophil Rel % 2.2   Basophil Rel % 0.4   (A) Abnormal value            Lipid Panel    Lipid Panel 5/21/21   Total Cholesterol 195   Triglycerides 110   HDL Cholesterol 48   VLDL Cholesterol 22   LDL Cholesterol  125 (A)   (A) Abnormal value       Comments are available for some flowsheets but are not being displayed.           TSH    TSH 5/21/21   TSH 0.993           Electrolytes    Electrolytes 5/21/21   Sodium 134 (A)   Potassium 3.9   Chloride 99   Calcium 9.3   (A) Abnormal value            Renal Profile    Renal Profile 5/21/21   BUN 13   Creatinine 1.23 (A)   (A) Abnormal value            BMP    BMP 5/21/21   BUN 13   Creatinine 1.23 (A)   Sodium 134 (A)   Potassium 3.9   Chloride 99   CO2 21 (A)   Calcium 9.3   (A) Abnormal value                    Data reviewed: PCP notes        Assessment and Plan    Problem List Items Addressed This Visit        Mental Health    Moderate episode of recurrent major depressive disorder (HCC) - Primary    Generalized anxiety disorder      Other Visit Diagnoses     Cluster B personality disorder in adult (HCC)                TREATMENT PLAN/GOALS: Continue supportive psychotherapy efforts and medications as indicated. Treatment and medication options discussed during today's visit. Patient ackowledged and verbally consented to continue with current treatment plan and was educated on the importance of compliance with treatment and follow-up appointments.    MEDICATION ISSUES:  We discussed risks, benefits, and side effects of the above medications and the patient was agreeable with the plan. Patient was educated on the importance of compliance with treatment and follow-up  appointments.  Patient is agreeable to call the office with any worsening of symptoms or onset of side effects. Patient is agreeable to call 911 or go to the nearest ER should he/she begin having SI/HI.  Safety plan and palliates and advised patient if she had any worsening symptoms or had any suicidal thoughts with plan or intent to go to the nearest ER for stabilization patient verbalized understanding.     -Increase venlafaxine to 150 mg XL daily for depression and anxiety.  Highly encouraged patient to take 2 of the tablets that she has now and if she had any side effects or worsening symptoms contact clinic she verbalized understanding.  -Continue psychotherapy to help with coping skills.      Counseled patient regarding multimodal approach with healthy nutrition, healthy sleep, regular physical activity, social activities, counseling, and medications.      Coping skills reviewed and encouraged positive framing of thoughts     Assisted patient in processing above session content; acknowledged and normalized patient’s thoughts, feelings, and concerns.  Applied  positive coping skills and behavior management in session.  Allowed patient to freely discuss issues without interruption or judgment. Provided safe, confidential environment to facilitate the development of positive therapeutic relationship and encourage open, honest communication. Assisted patient in identifying risk factors which would indicate the need for higher level of care including thoughts to harm self or others and/or self-harming behavior and encouraged patient to contact this office, call 911, or present to the nearest emergency room should any of these events occur. Discussed crisis intervention services and means to access.     MEDS ORDERED DURING VISIT:  No orders of the defined types were placed in this encounter.          Follow Up   Return in about 4 weeks (around 5/30/2022), or if symptoms worsen or fail to improve, for  Recheck.    Patient was given instructions and counseling regarding her condition or for health maintenance advice. Please see specific information pulled into the AVS if appropriate.     Some of the data in this electronic note has been brought forward from a previous encounter, any necessary changes have been made, it has been reviewed by this APRN, and it is accurate.      This document has been electronically signed by MARNIE Valdivia  May 2, 2022 12:17 EDT    Part of this note may be an electronic transcription/translation of spoken language to printed text using the Dragon Dictation System.

## 2022-05-12 RX ORDER — NICOTINE 10 MG
1 CARTRIDGE (EA) INHALATION AS NEEDED
Qty: 1 EACH | Refills: 0 | Status: SHIPPED | OUTPATIENT
Start: 2022-05-12 | End: 2022-06-13

## 2022-05-16 ENCOUNTER — TELEMEDICINE (OUTPATIENT)
Dept: PSYCHIATRY | Facility: CLINIC | Age: 32
End: 2022-05-16

## 2022-05-16 DIAGNOSIS — F33.1 MODERATE EPISODE OF RECURRENT MAJOR DEPRESSIVE DISORDER: Primary | ICD-10-CM

## 2022-05-16 DIAGNOSIS — F41.1 GENERALIZED ANXIETY DISORDER: ICD-10-CM

## 2022-05-16 PROCEDURE — 90837 PSYTX W PT 60 MINUTES: CPT | Performed by: COUNSELOR

## 2022-05-16 NOTE — PROGRESS NOTES
Date: May 16, 2022  Time In: 2:35 PM   Time Out: 3:45 PM  This provider is located at the [facility] (through [facility]), [address], [place] [address] using a secure [employer] Video Visit through [employer]. Patient is being seen remotely via telehealth at home address in [place] and stated they are in a secure environment for this session. The patient's condition being diagnosed/treated is appropriate for telemedicine. The provider identified herself as well as her credentials. The patient, and/or patients guardian, consent to be seen remotely, and when consent is given they understand that the consent allows for patient identifiable information to be sent to a third party as needed. They may refuse to be seen remotely at any time. The electronic data is encrypted and password protected, and the patient and/or guardian has been advised of the potential risks to privacy not withstanding such measures.     You have chosen to receive care through a telehealth visit.  Do you consent to use a video/audio connection for your medical care today? Yes    PROGRESS NOTE  Data:  Carina Sanchez is a 32 y.o. female who presents today for follow up. Patient states that her ex left her and then started ghosting her. Patient states that his mother came on [facility] and dropped the news that her ex and his wife got back together and would be attending [facility]; this news was relayed to the patient by the ex's brother and sister in law. Last week the patient stated that she received a text from an unknown number that ended up being her ex and they talked a couple of times and told her that he had wanted to be sure that she was ok and not hurting herself. Patient states that she didn't need the closure now but she has managed to get through the time but would stay up and then  Sleep. Patient states that she would take the ex back again under certain conditions. Patient is  aware that her ex is  and she is trying not to shut off the idea of love in general but she has a long history with this man. Role played with the patient how a conversation with the ex many go and discussed how hard it could be to set boundaries with this man given their history.  Patient continues to struggle with living with her parents and is not able to move out due to finances.  Patient states that she is looking for a job so that she can get her own place but right now she is not stable enough to do so and doesn't want to impose on friends who are trying to be supportive in the midst of preparing to have their own child    Chief Complaint: sleep deprivation then oversleeping, feelings of anxiety and depression    History of Present Illness: Patient has struggled with feelings of anxiety and depression for years      Clinical Maneuvering/Intervention:  CBT    (Scales based on 0 - 10 with 10 being the worst)  Depression: 4-5 Anxiety: 3-4       Assisted patient in processing above session content; acknowledged and normalized patient’s thoughts, feelings, and concerns.  Rationalized patient thought process regarding the patient's current situation.  Discussed triggers associated with patient's feelings of depression and anxiety such as fluctuating feelings for her ex, frustrations with living with her parents and stress from not having a job.  Also discussed coping skills for patient to implement such as determining what she wants from a relationship, continued job searching and practicing setting and maintaining boundaries with others,    Allowed patient to freely discuss issues without interruption or judgment. Provided safe, confidential environment to facilitate the development of positive therapeutic relationship and encourage open, honest communication. Assisted patient in identifying risk factors which would indicate the need for higher level of care including thoughts to harm self or others and/or  self-harming behavior and encouraged patient to contact this office, call 911, or present to the nearest emergency room should any of these events occur. Discussed crisis intervention services and means to access. Patient adamantly and convincingly denies current suicidal or homicidal ideation or perceptual disturbance.    Assessment:   Assessment   Patient appears to maintain relative stability as compared to their baseline.  However, patient continues to struggle with feelings of anxiety and depression which continues to cause impairment in important areas of functioning.  A result, they can be reasonably expected to continue to benefit from treatment and would likely be at increased risk for decompensation otherwise.    Mental Status Exam:   Hygiene:   good  Cooperation:  Cooperative  Eye Contact:  Good  Psychomotor Behavior:  Appropriate  Affect:  Appropriate  Mood: fluctates  Speech:  Normal  Thought Process:  Goal directed  Thought Content:  Normal  Suicidal:  None  Homicidal:  None  Hallucinations:  None  Delusion:  None  Memory:  Intact  Orientation:  Person, Place, Time and Situation  Reliability:  fair  Insight:  Fair  Judgement:  Good  Impulse Control:  Good  Physical/Medical Issues:  No        Patient's Support Network Includes:  extended family and friends    Functional Status: Moderate impairment     Progress toward goal: Not at goal    Prognosis: Fair with Ongoing Treatment          Plan:    Patient will continue in individual outpatient therapy with focus on improved functioning and coping skills, maintaining stability, and avoiding decompensation and the need for higher level of care.    Patient will adhere to medication regimen as prescribed and report any side effects. Patient will contact this office, call 911 or present to the nearest emergency room should suicidal or homicidal ideations occur. Provide Cognitive Behavioral Therapy and Solution Focused Therapy to improve functioning, maintain  stability, and avoid decompensation and the need for higher level of care.     Return in about 7 weeks, or earlier if symptoms worsen or fail to improve.           VISIT DIAGNOSIS:     ICD-10-CM ICD-9-CM   1. Moderate episode of recurrent major depressive disorder (HCC)  F33.1 296.32   2. Generalized anxiety disorder  F41.1 300.02             This document has been electronically signed by RADHA Johnson  May 16, 2022 15:51 EDT      Part of this note may be an electronic transcription/translation of spoken language to printed text using the Dragon Dictation System.

## 2022-06-01 DIAGNOSIS — F41.1 GENERALIZED ANXIETY DISORDER: ICD-10-CM

## 2022-06-01 DIAGNOSIS — F33.1 MODERATE EPISODE OF RECURRENT MAJOR DEPRESSIVE DISORDER: ICD-10-CM

## 2022-06-01 RX ORDER — VENLAFAXINE HYDROCHLORIDE 150 MG/1
150 CAPSULE, EXTENDED RELEASE ORAL DAILY
Qty: 90 CAPSULE | Refills: 0 | Status: SHIPPED | OUTPATIENT
Start: 2022-06-01 | End: 2022-10-11

## 2022-06-09 ENCOUNTER — TELEPHONE (OUTPATIENT)
Dept: INTERNAL MEDICINE | Facility: CLINIC | Age: 32
End: 2022-06-09

## 2022-06-09 NOTE — TELEPHONE ENCOUNTER
Caller: NEW, LEDA    Relationship to patient: Mother    Best call back number: [phone]    Patient is needing: PATIENT'S MOTHER CALLED IN AND SAID THAT PATIENT IS COVID POSITIVE AS OF TODAY AND MAY BE INTERESTED IN PAXLOVID.? PATIENT REQUESTS A CALL BACK WITH NEXT BEST STEPS PLEASE ASAP.      [facility] [address] [address] - [phone] [facility] [phone]   [phone]

## 2022-06-10 NOTE — TELEPHONE ENCOUNTER
Caller: NEW, LEDA    Relationship to patient: Mother    Best call back number: [phone]    Patient is needing: PATIENT'S MOTHER IS NEEDING AN UPDATE ON GETTING PAXLOVID FOR DAUGHTER. MOTHER STATED IT IS TIME SENSITIVE AND WOULD LIKE TO SPEAK WITH SOMEONE AS SOON AS POSSIBLE.

## 2022-06-10 NOTE — TELEPHONE ENCOUNTER
PATIENT'S MOTHER CALLED IN AND SAID THAT PATIENT IS COVID POSITIVE AS OF TODAY AND MAY BE INTERESTED IN PAXLOVID.? PATIENT REQUESTS A CALL BACK WITH NEXT BEST STEPS PLEASE ASAP.

## 2022-06-13 ENCOUNTER — OFFICE VISIT (OUTPATIENT)
Dept: INTERNAL MEDICINE | Facility: CLINIC | Age: 32
End: 2022-06-13

## 2022-06-13 VITALS
TEMPERATURE: 98.9 F | DIASTOLIC BLOOD PRESSURE: 90 MMHG | HEART RATE: 93 BPM | BODY MASS INDEX: 40.4 KG/M2 | OXYGEN SATURATION: 99 % | HEIGHT: 67 IN | SYSTOLIC BLOOD PRESSURE: 131 MMHG | WEIGHT: 257.4 LBS | RESPIRATION RATE: 17 BRPM

## 2022-06-13 DIAGNOSIS — U07.1 COVID-19 VIRUS INFECTION: Primary | ICD-10-CM

## 2022-06-13 DIAGNOSIS — I10 PRIMARY HYPERTENSION: ICD-10-CM

## 2022-06-13 DIAGNOSIS — F33.1 MODERATE EPISODE OF RECURRENT MAJOR DEPRESSIVE DISORDER: ICD-10-CM

## 2022-06-13 PROBLEM — F32.A DEPRESSION: Status: RESOLVED | Noted: 2021-06-10 | Resolved: 2022-06-13

## 2022-06-13 PROBLEM — F41.9 ANXIETY: Status: RESOLVED | Noted: 2021-06-10 | Resolved: 2022-06-13

## 2022-06-13 PROCEDURE — 99214 OFFICE O/P EST MOD 30 MIN: CPT | Performed by: INTERNAL MEDICINE

## 2022-06-13 RX ORDER — AZELASTINE HYDROCHLORIDE, FLUTICASONE PROPIONATE 137; 50 UG/1; UG/1
2 SPRAY, METERED NASAL DAILY
Qty: 23 G | Refills: 3 | Status: SHIPPED | OUTPATIENT
Start: 2022-06-13

## 2022-06-13 NOTE — PROGRESS NOTES
"Chief Complaint  Depression (Follow-up)    Subjective          Carina Sanchez presents to [facility]  History of Present Illness     Diagnosed with COVID Thursday  Started with congestion and intermittent cough  Then started having body aches all over with joints and muscles  Fever started as well Tmax 101  Those symptoms started to improve  Sleeping a lot  Drinking well  Has lost her sense of smell, can still taste    Depression-  States that things didn't go well with her relationship  He went back to his wife  States that his brother has helped her quite a bit  She did have one day that was really bad and she had some SI that day  \"it still hurts\"  Continues to work with counselors at [facility]    Objective   Vital Signs:   /90 (BP Location: Left arm, Patient Position: Sitting, Cuff Size: Large Adult) Comment: has not taken medication  Pulse 93   Temp 98.9 °F (37.2 °C) (Oral)   Resp 17   Ht 168.9 cm (66.5\")   Wt 117 kg (257 lb 6.4 oz)   SpO2 99%   BMI 40.92 kg/m²     Physical Exam  Vitals reviewed.   Constitutional:       Appearance: Normal appearance. She is well-developed. She is obese.   HENT:      Head: Normocephalic and atraumatic.      Right Ear: External ear normal.      Left Ear: External ear normal.   Eyes:      Conjunctiva/sclera: Conjunctivae normal.      Pupils: Pupils are equal, round, and reactive to light.   Cardiovascular:      Rate and Rhythm: Normal rate and regular rhythm.      Heart sounds: No murmur heard.    No friction rub. No gallop.   Pulmonary:      Effort: Pulmonary effort is normal.      Breath sounds: Normal breath sounds. No wheezing or rhonchi.   Skin:     General: Skin is warm and dry.   Neurological:      Mental Status: She is alert and oriented to person, place, and time.   Psychiatric:         Mood and Affect: Affect normal.         Behavior: Behavior normal.         Thought Content: Thought content normal.      "   Result Review :{Labs  Result Review  Imaging  Med Tab  Media  Procedures :23}           Results for orders placed or performed in visit on 03/25/22   POC Pregnancy, Urine    Specimen: Urine   Result Value Ref Range    HCG, Urine, QL Negative Negative    Lot Number [id number]     Internal Positive Control Positive Positive, Passed    Internal Negative Control Negative Negative, Passed    Expiration Date 05/31/23             Procedures        Assessment and Plan    Diagnoses and all orders for this visit:    1. COVID-19 virus infection (Primary)  Comments:  doing well, cont to monitor discussed signs and symptoms to let us know about      2. Moderate episode of recurrent major depressive disorder (HCC)  Comments:  no SI currently, discussed importance of continuing to work with the counselor and psychiatrist    3. Primary hypertension  Comments:  will cont to monitor; a little high, likely covid related, she will check at home and let us know if worsening    Other orders  -     Azelastine-Fluticasone 137-50 MCG/ACT suspension; 2 puffs into the nostril(s) as directed by provider Daily.  Dispense: 23 g; Refill: 3                  Follow Up   Return in about 4 months (around 10/13/2022).  Patient was given instructions and counseling regarding her condition or for health maintenance advice. Please see specific information pulled into the AVS if appropriate.

## 2022-06-17 ENCOUNTER — PATIENT MESSAGE (OUTPATIENT)
Dept: INTERNAL MEDICINE | Facility: CLINIC | Age: 32
End: 2022-06-17

## 2022-06-17 ENCOUNTER — CLINICAL SUPPORT (OUTPATIENT)
Dept: INTERNAL MEDICINE | Facility: CLINIC | Age: 32
End: 2022-06-17

## 2022-06-17 DIAGNOSIS — Z30.9 ENCOUNTER FOR CONTRACEPTIVE MANAGEMENT, UNSPECIFIED TYPE: Primary | ICD-10-CM

## 2022-06-17 PROCEDURE — 96372 THER/PROPH/DIAG INJ SC/IM: CPT | Performed by: INTERNAL MEDICINE

## 2022-06-17 RX ORDER — MEDROXYPROGESTERONE ACETATE 150 MG/ML
150 INJECTION, SUSPENSION INTRAMUSCULAR
Status: SHIPPED | OUTPATIENT
Start: 2022-06-17

## 2022-06-17 RX ADMIN — MEDROXYPROGESTERONE ACETATE 150 MG: 150 INJECTION, SUSPENSION INTRAMUSCULAR at 16:27

## 2022-06-17 NOTE — TELEPHONE ENCOUNTER
Please call [facility] and see if this is something they do.  If so I dont' mind to send script over.

## 2022-06-17 NOTE — TELEPHONE ENCOUNTER
From: Carina Sanchez  To: Amber Byrne MD  Sent: 6/17/2022 9:59 AM EDT  Subject: Depo Provera    Good morning, Doctor! On March 25th I received my depo provera injection. My period decided this morning it was going to try and start up and reminded me that I am needing a refill for my next injection. :) Thank you!! Hope you have a wonderful weekend!

## 2022-06-22 ENCOUNTER — TELEMEDICINE (OUTPATIENT)
Dept: PSYCHIATRY | Facility: CLINIC | Age: 32
End: 2022-06-22

## 2022-06-22 DIAGNOSIS — F41.1 GENERALIZED ANXIETY DISORDER: ICD-10-CM

## 2022-06-22 DIAGNOSIS — F60.9 CLUSTER B PERSONALITY DISORDER IN ADULT: ICD-10-CM

## 2022-06-22 DIAGNOSIS — F33.1 MODERATE EPISODE OF RECURRENT MAJOR DEPRESSIVE DISORDER: Primary | ICD-10-CM

## 2022-06-22 PROCEDURE — 99214 OFFICE O/P EST MOD 30 MIN: CPT | Performed by: NURSE PRACTITIONER

## 2022-06-22 RX ORDER — CLONIDINE HYDROCHLORIDE 0.1 MG/1
0.1 TABLET ORAL NIGHTLY PRN
Qty: 30 TABLET | Refills: 0 | Status: SHIPPED | OUTPATIENT
Start: 2022-06-22 | End: 2022-10-03 | Stop reason: SDUPTHER

## 2022-06-22 NOTE — PROGRESS NOTES
This provider is located at [facility], [address] [place] [address].The Patient is seen remotely at home, [address] [place] [address] Dr. Mirella [place] [address] via [employer].  Patient is being seen via telehealth and confirm that they are in a secure environment for this session. The patient's condition being diagnosed/treated is appropriate for telemedicine. The provider identified himself/herself: herself as well as her credentials.   The patient gave consent to be seen remotely, and when consent is given they understand that the consent allows for patient identifiable information to be sent to a third party as needed.   They may refuse to be seen remotely at any time. The electronic data is encrypted and password protected, and the patient has been advised of the potential risks to privacy not withstanding such measures.    You have chosen to receive care through a telehealth visit.  Do you consent to use a video/audio connection for your medical care today? Yes. Patient verified Name, , and address.       Chief Complaint  Depression and anxiety     Subjective          Carina Sanchez presents to [facility] for medication management.     History of Present Illness   Patient presents today reporting that increase has been helpful and denies any side effects with the increase.  Patient states her depression has been better as she rates it a 5 on a scale of 0-10 with 10 being the worst.  She denies any hopeless or helplessness but states she still is struggling at times due to past relationship as well as trying to find a job.  She states she may move to [facility] and live with a friend as there are job openings as she states she has applied to several jobs in her town and has not heard anything back.  Patient states that she has had better days with the depression but is still up and down at times.  Patient reports there 1 or possibly 2 days where  she cannot seem to fall asleep and then she will sleep excessively but still feel unrested.  Patient reports appetite is good.  Patient states that her anxiety is a 6-7 on a scale of 0-10 with 10 being the worst.  Noting that it would be much lower but she is having parental issues but otherwise things would be much better.  Patient states that irritability and agitation are still significant for her especially with issues with her sleep.  Denies any SI/HI/AH/VH.      Objective   Vital Signs:   There were no vitals taken for this visit.  Due to the remote nature of this encounter (virtual encounter), vitals were unable to be obtained.  Height stated at 66 inches.  Weight stated at 270 pounds.        PHQ-9 Score:   PHQ-9 Total Score:       Patient screened positive for depression based on a PHQ-9 score of  on . Follow-up recommendations include: see notes and medication list.    PHQ-9 Depression Screening  Little interest or pleasure in doing things?     Feeling down, depressed, or hopeless?     Trouble falling or staying asleep, or sleeping too much?     Feeling tired or having little energy?     Poor appetite or overeating?     Feeling bad about yourself - or that you are a failure or have let yourself or your family down?     Trouble concentrating on things, such as reading the newspaper or watching television?     Moving or speaking so slowly that other people could have noticed? Or the opposite - being so fidgety or restless that you have been moving around a lot more than usual?     Thoughts that you would be better off dead, or of hurting yourself in some way?     PHQ-9 Total Score     If you checked off any problems, how difficult have these problems made it for you to do your work, take care of things at home, or get along with other people?       PHQ-9 Total Score:               PROMIS scale screening tool that patient filled out virtually reviewed by this APRN at today's encounter.      Mental Status Exam:    Hygiene:   good  Cooperation:  Cooperative  Eye Contact:  Good  Psychomotor Behavior:  Restless  Affect:  Appropriate  Mood: normal and anxious  Speech:  Normal  Thought Process:  Goal directed  Thought Content:  Normal  Suicidal:  None  Homicidal:  None  Hallucinations:  None  Delusion:  None  Memory:  Intact  Orientation:  Person, Place, Time and Situation  Reliability:  fair  Insight:  Fair  Judgement:  Fair  Impulse Control:  Fair  Physical/Medical Issues:  Yes see problem list     Current Medications:   Current Outpatient Medications   Medication Sig Dispense Refill   • Azelastine-Fluticasone 137-50 MCG/ACT suspension 2 puffs into the nostril(s) as directed by provider Daily. 23 g 3   • cloNIDine (Catapres) 0.1 MG tablet Take 1 tablet by mouth At Night As Needed (for sleep and agitation). 30 tablet 0   • lisinopril (PRINIVIL,ZESTRIL) 10 MG tablet Take 1 tablet by mouth Daily. 90 tablet 1   • omeprazole (priLOSEC) 40 MG capsule Take 1 capsule by mouth Daily. before a meal 90 capsule 1   • venlafaxine XR (Effexor XR) 150 MG 24 hr capsule Take 1 capsule by mouth Daily. 90 capsule 0     Current Facility-Administered Medications   Medication Dose Route Frequency Provider Last Rate Last Admin   • medroxyPROGESTERone (DEPO-PROVERA) injection 150 mg  150 mg Intramuscular Q3 Months Amber Byrne MD   150 mg at 06/17/22 1627       Physical Exam  Nursing note reviewed.   Constitutional:       Appearance: Normal appearance.   Neurological:      Mental Status: She is alert.   Psychiatric:         Attention and Perception: Attention and perception normal.         Mood and Affect: Mood is anxious and depressed.         Speech: Speech normal.         Behavior: Behavior is cooperative.         Cognition and Memory: Cognition and memory normal.         Judgment: Judgment is impulsive.        Result Review :     The following data was reviewed by: MARNIE Valdivia on 05/02/2022:        CBC w/diff    CBC w/Diff  5/21/21   WBC 10.11   RBC 4.73   Hemoglobin 11.8 (A)   Hematocrit 38.2   MCV 80.8 (A)   MCH 24.9 (A)   MCHC 30.9 (A)   RDW 14.1   Platelets 376   Neutrophil Rel % 56.0   Lymphocyte Rel % 34.5   Monocyte Rel % 6.5   Eosinophil Rel % 2.2   Basophil Rel % 0.4   (A) Abnormal value                              Data reviewed: PCP notes        Assessment and Plan    Problem List Items Addressed This Visit        Mental Health    Moderate episode of recurrent major depressive disorder (HCC) - Primary    Generalized anxiety disorder    Relevant Medications    cloNIDine (Catapres) 0.1 MG tablet      Other Visit Diagnoses     Cluster B personality disorder in adult (HCC)        Relevant Medications    cloNIDine (Catapres) 0.1 MG tablet            TREATMENT PLAN/GOALS: Continue supportive psychotherapy efforts and medications as indicated. Treatment and medication options discussed during today's visit. Patient ackowledged and verbally consented to continue with current treatment plan and was educated on the importance of compliance with treatment and follow-up appointments.    MEDICATION ISSUES:  We discussed risks, benefits, and side effects of the above medications and the patient was agreeable with the plan. Patient was educated on the importance of compliance with treatment and follow-up appointments.  Patient is agreeable to call the office with any worsening of symptoms or onset of side effects. Patient is agreeable to call 911 or go to the nearest ER should he/she begin having SI/HI.  Safety plan and palliates and advised patient if she had any worsening symptoms or had any suicidal thoughts with plan or intent to go to the nearest ER for stabilization patient verbalized understanding.     -Continue venlafaxine 150 mg XR daily for depression and anxiety symptoms.  -Begin clonidine 0.1 mg at night as needed for sleep as well as agitation and irritability.  Encouraged patient if she had any significant decrease in her  blood pressure or heart rate or dizziness or headaches to discontinue and contact the clinic as we could do a trial of guanfacine 1 mg at night patient verbalized understanding.  -Continue psychotherapy to help with coping skills.      Counseled patient regarding multimodal approach with healthy nutrition, healthy sleep, regular physical activity, social activities, counseling, and medications.      Coping skills reviewed and encouraged positive framing of thoughts     Assisted patient in processing above session content; acknowledged and normalized patient’s thoughts, feelings, and concerns.  Applied  positive coping skills and behavior management in session.  Allowed patient to freely discuss issues without interruption or judgment. Provided safe, confidential environment to facilitate the development of positive therapeutic relationship and encourage open, honest communication. Assisted patient in identifying risk factors which would indicate the need for higher level of care including thoughts to harm self or others and/or self-harming behavior and encouraged patient to contact this office, call 911, or present to the nearest emergency room should any of these events occur. Discussed crisis intervention services and means to access.     MEDS ORDERED DURING VISIT:  New Medications Ordered This Visit   Medications   • cloNIDine (Catapres) 0.1 MG tablet     Sig: Take 1 tablet by mouth At Night As Needed (for sleep and agitation).     Dispense:  30 tablet     Refill:  0           Follow Up   Return in about 4 weeks (around 7/20/2022), or if symptoms worsen or fail to improve, for Recheck.    Patient was given instructions and counseling regarding her condition or for health maintenance advice. Please see specific information pulled into the AVS if appropriate.     Some of the data in this electronic note has been brought forward from a previous encounter, any necessary changes have been made, it has been reviewed by  this APRN, and it is accurate.      This document has been electronically signed by MARNIE Valdivia  June 22, 2022 09:52 EDT    Part of this note may be an electronic transcription/translation of spoken language to printed text using the Dragon Dictation System.

## 2022-07-12 ENCOUNTER — TELEMEDICINE (OUTPATIENT)
Dept: PSYCHIATRY | Facility: CLINIC | Age: 32
End: 2022-07-12

## 2022-07-12 DIAGNOSIS — F41.1 GENERALIZED ANXIETY DISORDER: Primary | ICD-10-CM

## 2022-07-12 DIAGNOSIS — F33.1 MODERATE EPISODE OF RECURRENT MAJOR DEPRESSIVE DISORDER: ICD-10-CM

## 2022-07-12 PROCEDURE — 90834 PSYTX W PT 45 MINUTES: CPT | Performed by: COUNSELOR

## 2022-07-12 NOTE — PROGRESS NOTES
Date: July 26, 2022  Time In: 1:44 PM  Time Out: 2:25 PM   This provider is located at the [facility]), [address], [place] [address] using a secure [employer] Video Visit through [employer]. Patient is being seen remotely via telehealth at home address in [place] and stated they are in a secure environment for this session. The patient's condition being diagnosed/treated is appropriate for telemedicine. The provider identified herself as well as her credentials. The patient, and/or patients guardian, consent to be seen remotely, and when consent is given they understand that the consent allows for patient identifiable information to be sent to a third party as needed. They may refuse to be seen remotely at any time. The electronic data is encrypted and password protected, and the patient and/or guardian has been advised of the potential risks to privacy not withstanding such measures.     You have chosen to receive care through a telehealth visit.  Do you consent to use a video/audio connection for your medical care today? Yes    PROGRESS NOTE  Data:  Carina Sanchez is a 32 y.o. female who presents today for follow up. Patient has a job now as a 2.5 weeks ago at the [employer]. Patient states that it gets a little chaotic and then gets slow but she is managing it overall. Patient states that things at home have been a little rough due to the toxicity and she is looking to move out as soon as she can save up some money. Patient states that she is ridiculed by her mother and step father about her work and financial situation and those things cause arguments and also if the patient doesn't do the dishes she is complained at. Patient is looking into a remote job with [employer] as she interviewed with them last week. Patient also looking into a small apartment that will be affordable for her but it isn't ready at this time as she wants to get out  of her parents home as soon as possible.  Patient feels that she is treated like a servant in the home and that she is not viewed as an adult which adds to her frustration, anxiety and feelings of depression.    Chief Complaint: anxiety, depression, financial concerns, frustration    History of Present Illness: Patient has struggled with anxiety and depression for several years      Clinical Maneuvering/Intervention: CBT    (Scales based on 0 - 10 with 10 being the worst)  Depression: 6 Anxiety: 6       Assisted patient in processing above session content; acknowledged and normalized patient’s thoughts, feelings, and concerns.  Rationalized patient thought process regarding wanting to be out of her parents home.  Discussed triggers associated with patient's feelings of anxiety and depression such as issues with her parents, feelings of loneliness and adjusting to her new job.  Also discussed coping skills for patient to implement such as painting, spending time with her dogs, spending time with her extended family.    Allowed patient to freely discuss issues without interruption or judgment. Provided safe, confidential environment to facilitate the development of positive therapeutic relationship and encourage open, honest communication. Assisted patient in identifying risk factors which would indicate the need for higher level of care including thoughts to harm self or others and/or self-harming behavior and encouraged patient to contact this office, call 911, or present to the nearest emergency room should any of these events occur. Discussed crisis intervention services and means to access. Patient adamantly and convincingly denies current suicidal or homicidal ideation or perceptual disturbance.    Assessment:   Assessment   Patient appears to maintain relative stability as compared to their baseline.  However, patient continues to struggle with fluctuating feelings of anxiety and depression which continues to  cause impairment in important areas of functioning.  A result, they can be reasonably expected to continue to benefit from treatment and would likely be at increased risk for decompensation otherwise.    Mental Status Exam:   Hygiene:   good  Cooperation:  Cooperative  Eye Contact:  Good  Psychomotor Behavior:  Appropriate  Affect:  Appropriate  Mood: tired  Speech:  Normal  Thought Process:  Goal directed  Thought Content:  Normal  Suicidal:  None  Homicidal:  None  Hallucinations:  None  Delusion:  None  Memory:  Intact  Orientation:  Person, Place, Time and Situation  Reliability:  good  Insight:  Good  Judgement:  Good  Impulse Control:  Good  Physical/Medical Issues:  Yes pain in elbow, numbness in fingers, pressure in forearm and wrist       Patient's Support Network Includes:  extended family and friend    Functional Status: Moderate impairment     Progress toward goal: Not at goal    Prognosis: Good with Ongoing Treatment          Plan:    Patient will continue in individual outpatient therapy with focus on improved functioning and coping skills, maintaining stability, and avoiding decompensation and the need for higher level of care.    Patient will adhere to medication regimen as prescribed and report any side effects. Patient will contact this office, call 911 or present to the nearest emergency room should suicidal or homicidal ideations occur. Provide Cognitive Behavioral Therapy and Solution Focused Therapy to improve functioning, maintain stability, and avoid decompensation and the need for higher level of care.     Return in about 2weeks, or earlier if symptoms worsen or fail to improve.           VISIT DIAGNOSIS:     ICD-10-CM ICD-9-CM   1. Generalized anxiety disorder  F41.1 300.02   2. Moderate episode of recurrent major depressive disorder (HCC)  F33.1 296.32             This document has been electronically signed by RADHA Johnson  July 26, 2022 09:04 EDT      Part of this note may be  an electronic transcription/translation of spoken language to printed text using the Dragon Dictation System.

## 2022-09-12 ENCOUNTER — CLINICAL SUPPORT (OUTPATIENT)
Dept: INTERNAL MEDICINE | Facility: CLINIC | Age: 32
End: 2022-09-12

## 2022-09-12 DIAGNOSIS — Z30.9 ENCOUNTER FOR CONTRACEPTIVE MANAGEMENT, UNSPECIFIED TYPE: Primary | ICD-10-CM

## 2022-09-12 PROCEDURE — 96372 THER/PROPH/DIAG INJ SC/IM: CPT | Performed by: INTERNAL MEDICINE

## 2022-09-12 RX ADMIN — MEDROXYPROGESTERONE ACETATE 150 MG: 150 INJECTION, SUSPENSION INTRAMUSCULAR at 10:38

## 2022-09-26 ENCOUNTER — PATIENT MESSAGE (OUTPATIENT)
Dept: INTERNAL MEDICINE | Facility: CLINIC | Age: 32
End: 2022-09-26

## 2022-09-26 DIAGNOSIS — F60.9 CLUSTER B PERSONALITY DISORDER IN ADULT: ICD-10-CM

## 2022-09-26 DIAGNOSIS — F41.1 GENERALIZED ANXIETY DISORDER: ICD-10-CM

## 2022-10-03 RX ORDER — CLONIDINE HYDROCHLORIDE 0.1 MG/1
0.1 TABLET ORAL NIGHTLY PRN
Qty: 90 TABLET | Refills: 2 | Status: SHIPPED | OUTPATIENT
Start: 2022-10-03 | End: 2022-11-29 | Stop reason: SDUPTHER

## 2022-10-03 NOTE — TELEPHONE ENCOUNTER
From: Carina Sanchez  To: Amber Byrne MD  Sent: 9/26/2022 7:15 PM EDT  Subject: Check-In    Good evening! Congratulations on your tiny bundle of aries and hope you’ve had a good/easy maternity leave! Megan Fall had prescribed me Catapres/clonidine in addition to my Effexor and to be honest I noticed more of a positive change than I have with any of my other antidepressants I’ve tried. I missed my last appointment with her due to work but I would prefer to follow up with you. I know that appointments are way out but I will schedule one after this message for as early as I can. Would you please refill the catapres until I am able to get in to see you?     Thank you,  Carina Sanchez

## 2022-10-11 ENCOUNTER — OFFICE VISIT (OUTPATIENT)
Dept: INTERNAL MEDICINE | Facility: CLINIC | Age: 32
End: 2022-10-11

## 2022-10-11 VITALS
HEIGHT: 67 IN | TEMPERATURE: 97.4 F | RESPIRATION RATE: 12 BRPM | DIASTOLIC BLOOD PRESSURE: 80 MMHG | WEIGHT: 262.8 LBS | OXYGEN SATURATION: 99 % | BODY MASS INDEX: 41.25 KG/M2 | SYSTOLIC BLOOD PRESSURE: 130 MMHG | HEART RATE: 106 BPM

## 2022-10-11 DIAGNOSIS — F41.1 GENERALIZED ANXIETY DISORDER: ICD-10-CM

## 2022-10-11 DIAGNOSIS — F33.1 MODERATE EPISODE OF RECURRENT MAJOR DEPRESSIVE DISORDER: Primary | ICD-10-CM

## 2022-10-11 DIAGNOSIS — J30.9 ALLERGIC RHINITIS, UNSPECIFIED SEASONALITY, UNSPECIFIED TRIGGER: ICD-10-CM

## 2022-10-11 DIAGNOSIS — I10 PRIMARY HYPERTENSION: ICD-10-CM

## 2022-10-11 DIAGNOSIS — F33.1 MODERATE EPISODE OF RECURRENT MAJOR DEPRESSIVE DISORDER: ICD-10-CM

## 2022-10-11 PROCEDURE — 99214 OFFICE O/P EST MOD 30 MIN: CPT | Performed by: INTERNAL MEDICINE

## 2022-10-11 RX ORDER — LISINOPRIL 10 MG/1
10 TABLET ORAL DAILY
Qty: 90 TABLET | Refills: 1 | Status: SHIPPED | OUTPATIENT
Start: 2022-10-11 | End: 2023-01-27 | Stop reason: SDUPTHER

## 2022-10-11 RX ORDER — OMEPRAZOLE 20 MG/1
20 CAPSULE, DELAYED RELEASE ORAL DAILY
Qty: 90 CAPSULE | Refills: 1 | Status: SHIPPED | OUTPATIENT
Start: 2022-10-11 | End: 2022-11-29

## 2022-10-11 NOTE — PROGRESS NOTES
"Chief Complaint  Depression    Subjective          Carina Sanchez presents to [facility]  History of Present Illness    Still struggling with her mental health right now  Didn't have a great fit with the [facility] telehealth   However she did like the clonidine  She felt like it helped control her symptoms well  She feels like it has helped more than anything    States that it has been about a month since she has taken anything    No SI, but just maybe some numb or passive feelings    Objective   Vital Signs:   /80 (BP Location: Left arm, Patient Position: Sitting, Cuff Size: Large Adult)   Pulse 106   Temp 97.4 °F (36.3 °C) (Temporal)   Resp 12   Ht 168.9 cm (66.5\")   Wt 119 kg (262 lb 12.8 oz)   SpO2 99%   BMI 41.78 kg/m²     Physical Exam  Vitals reviewed.   Constitutional:       Appearance: Normal appearance. She is well-developed. She is obese.   HENT:      Head: Normocephalic and atraumatic.      Right Ear: External ear normal.      Left Ear: External ear normal.   Eyes:      Conjunctiva/sclera: Conjunctivae normal.      Pupils: Pupils are equal, round, and reactive to light.   Cardiovascular:      Rate and Rhythm: Normal rate and regular rhythm.      Heart sounds: No murmur heard.    No friction rub. No gallop.   Pulmonary:      Effort: Pulmonary effort is normal.      Breath sounds: Normal breath sounds. No wheezing or rhonchi.   Skin:     General: Skin is warm and dry.   Neurological:      Mental Status: She is alert and oriented to person, place, and time.   Psychiatric:         Mood and Affect: Affect normal.         Behavior: Behavior normal.         Thought Content: Thought content normal.        Result Review :           Results for orders placed or performed in visit on 03/25/22   POC Pregnancy, Urine    Specimen: Urine   Result Value Ref Range    HCG, Urine, QL Negative Negative    Lot Number [id number]     Internal Positive Control " Positive Positive, Passed    Internal Negative Control Negative Negative, Passed    Expiration Date 05/31/23             Procedures        Assessment and Plan    Diagnoses and all orders for this visit:    1. Moderate episode of recurrent major depressive disorder (HCC) (Primary)  Comments:  will start with clonidine as it was working well; next step would be to increase to 0.2mg and or add vraylar    2. Generalized anxiety disorder  Comments:  will try clonidine; trying counseling again    3. Primary hypertension  Comments:  will restart lisinopril    4. Moderate episode of recurrent major depressive disorder (HCC)  Comments:   Appears to be doing much better however will increase effexor    5. Generalized anxiety disorder  Comments:  continue counseling    6. Allergic rhinitis, unspecified seasonality, unspecified trigger  Comments:  restart allegra and flonase    Other orders  -     omeprazole (priLOSEC) 20 MG capsule; Take 1 capsule by mouth Daily. before a meal  Dispense: 90 capsule; Refill: 1  -     lisinopril (PRINIVIL,ZESTRIL) 10 MG tablet; Take 1 tablet by mouth Daily.  Dispense: 90 tablet; Refill: 1                  Follow Up   Return in about 6 weeks (around 11/22/2022).  Patient was given instructions and counseling regarding her condition or for health maintenance advice. Please see specific information pulled into the AVS if appropriate.

## 2022-11-29 ENCOUNTER — OFFICE VISIT (OUTPATIENT)
Dept: INTERNAL MEDICINE | Facility: CLINIC | Age: 32
End: 2022-11-29

## 2022-11-29 ENCOUNTER — PATIENT MESSAGE (OUTPATIENT)
Dept: INTERNAL MEDICINE | Facility: CLINIC | Age: 32
End: 2022-11-29

## 2022-11-29 VITALS
WEIGHT: 258.4 LBS | OXYGEN SATURATION: 99 % | HEART RATE: 123 BPM | SYSTOLIC BLOOD PRESSURE: 150 MMHG | TEMPERATURE: 97.9 F | HEIGHT: 67 IN | BODY MASS INDEX: 40.56 KG/M2 | DIASTOLIC BLOOD PRESSURE: 92 MMHG

## 2022-11-29 DIAGNOSIS — I10 PRIMARY HYPERTENSION: ICD-10-CM

## 2022-11-29 DIAGNOSIS — F60.9 CLUSTER B PERSONALITY DISORDER IN ADULT: ICD-10-CM

## 2022-11-29 DIAGNOSIS — F41.1 GENERALIZED ANXIETY DISORDER: Primary | ICD-10-CM

## 2022-11-29 DIAGNOSIS — K21.9 GASTROESOPHAGEAL REFLUX DISEASE, UNSPECIFIED WHETHER ESOPHAGITIS PRESENT: ICD-10-CM

## 2022-11-29 DIAGNOSIS — J30.9 ALLERGIC RHINITIS, UNSPECIFIED SEASONALITY, UNSPECIFIED TRIGGER: ICD-10-CM

## 2022-11-29 PROCEDURE — 99214 OFFICE O/P EST MOD 30 MIN: CPT | Performed by: INTERNAL MEDICINE

## 2022-11-29 PROCEDURE — 90686 IIV4 VACC NO PRSV 0.5 ML IM: CPT | Performed by: INTERNAL MEDICINE

## 2022-11-29 PROCEDURE — 90471 IMMUNIZATION ADMIN: CPT | Performed by: INTERNAL MEDICINE

## 2022-11-29 RX ORDER — PANTOPRAZOLE SODIUM 20 MG/1
20 TABLET, DELAYED RELEASE ORAL DAILY
Qty: 90 TABLET | Refills: 1 | Status: SHIPPED | OUTPATIENT
Start: 2022-11-29 | End: 2023-01-27 | Stop reason: SDUPTHER

## 2022-11-29 RX ORDER — CLONIDINE HYDROCHLORIDE 0.2 MG/1
0.2 TABLET ORAL NIGHTLY PRN
Qty: 90 TABLET | Refills: 1 | Status: SHIPPED | OUTPATIENT
Start: 2022-11-29 | End: 2023-01-27 | Stop reason: SDUPTHER

## 2022-11-29 NOTE — PROGRESS NOTES
"Chief Complaint  Follow-up (6 week follow up ), Anxiety (Follow up), Depression (Follow up ), Heartburn (Follow up), Sinus Problem (Follow up, chronic runny nose ), and Hypertension (Follow up )    Subjective          Carina Sanchez presents to [facility]  History of Present Illness     Has had a little bit of congestion right now  No fever    However states that she always has something like a runny nose for the last year  She doesn't take her nasal spray super consistently    She has lost 4lbs    Still working at the [employer] store currently    Anxiety and depression are still bothering her quite a bit  Feels like her current relationships are doing a little better    States that sleep isn't great    No chest pain    Breathing well      gerd is worsening and she would like to try protonix instead      Objective   Vital Signs:   /92 (BP Location: Right arm, Patient Position: Sitting, Cuff Size: Adult)   Pulse (!) 123   Temp 97.9 °F (36.6 °C)   Ht 168.9 cm (66.5\")   Wt 117 kg (258 lb 6.4 oz)   SpO2 99%   BMI 41.08 kg/m²     Physical Exam  Vitals reviewed.   Constitutional:       Appearance: Normal appearance. She is well-developed. She is obese.   HENT:      Head: Normocephalic and atraumatic.      Right Ear: External ear normal.      Left Ear: External ear normal.      Nose: Congestion present.   Eyes:      Conjunctiva/sclera: Conjunctivae normal.      Pupils: Pupils are equal, round, and reactive to light.   Cardiovascular:      Rate and Rhythm: Normal rate and regular rhythm.      Heart sounds: No murmur heard.    No friction rub. No gallop.   Pulmonary:      Effort: Pulmonary effort is normal.      Breath sounds: Normal breath sounds. No wheezing or rhonchi.   Skin:     General: Skin is warm and dry.   Neurological:      Mental Status: She is alert and oriented to person, place, and time.   Psychiatric:         Mood and Affect: Affect normal.         " Behavior: Behavior normal.         Thought Content: Thought content normal.        Result Review :           Results for orders placed or performed in visit on 03/25/22   POC Pregnancy, Urine    Specimen: Urine   Result Value Ref Range    HCG, Urine, QL Negative Negative    Lot Number [id number]     Internal Positive Control Positive Positive, Passed    Internal Negative Control Negative Negative, Passed    Expiration Date 05/31/23             Procedures        Assessment and Plan    Diagnoses and all orders for this visit:    1. Generalized anxiety disorder (Primary)  Comments:  will try vraylar  Orders:  -     cloNIDine (Catapres) 0.2 MG tablet; Take 1 tablet by mouth At Night As Needed (for sleep and agitation).  Dispense: 90 tablet; Refill: 1    2. Cluster B personality disorder in adult (HCC)  -     cloNIDine (Catapres) 0.2 MG tablet; Take 1 tablet by mouth At Night As Needed (for sleep and agitation).  Dispense: 90 tablet; Refill: 1    3. Allergic rhinitis, unspecified seasonality, unspecified trigger  Comments:  encouraged her to take consistantly    4. Primary hypertension  Comments:  as she just took pills this AM will stick with current pills for now and increase clonidine    5. Gastroesophageal reflux disease, unspecified whether esophagitis present  Comments:  will switch to protonix    Other orders  -     FluLaval/Fluarix/Fluzone >6 Months  -     Cariprazine HCl (Vraylar) 1.5 MG capsule capsule; Take 1 capsule by mouth Daily.  Dispense: 90 capsule; Refill: 1  -     pantoprazole (Protonix) 20 MG EC tablet; Take 1 tablet by mouth Daily.  Dispense: 90 tablet; Refill: 1                    Follow Up   Return in about 6 weeks (around 1/10/2023).  Patient was given instructions and counseling regarding her condition or for health maintenance advice. Please see specific information pulled into the AVS if appropriate.

## 2022-12-07 ENCOUNTER — TELEPHONE (OUTPATIENT)
Dept: INTERNAL MEDICINE | Facility: CLINIC | Age: 32
End: 2022-12-07

## 2022-12-07 NOTE — TELEPHONE ENCOUNTER
PA APPROVED for Vraylar.    Confirmed with [facility] pharmacy that patient has picked up rx.

## 2022-12-09 ENCOUNTER — PRIOR AUTHORIZATION (OUTPATIENT)
Dept: INTERNAL MEDICINE | Facility: CLINIC | Age: 32
End: 2022-12-09

## 2022-12-15 ENCOUNTER — CLINICAL SUPPORT (OUTPATIENT)
Dept: INTERNAL MEDICINE | Facility: CLINIC | Age: 32
End: 2022-12-15

## 2022-12-15 DIAGNOSIS — Z30.9 ENCOUNTER FOR CONTRACEPTIVE MANAGEMENT, UNSPECIFIED TYPE: Primary | ICD-10-CM

## 2022-12-15 LAB
B-HCG UR QL: NEGATIVE
EXPIRATION DATE: NORMAL
INTERNAL NEGATIVE CONTROL: NORMAL
INTERNAL POSITIVE CONTROL: NORMAL
Lab: NORMAL

## 2022-12-15 PROCEDURE — 96372 THER/PROPH/DIAG INJ SC/IM: CPT | Performed by: INTERNAL MEDICINE

## 2022-12-15 PROCEDURE — 81025 URINE PREGNANCY TEST: CPT | Performed by: INTERNAL MEDICINE

## 2022-12-15 RX ADMIN — MEDROXYPROGESTERONE ACETATE 150 MG: 150 INJECTION, SUSPENSION INTRAMUSCULAR at 08:50

## 2023-01-27 ENCOUNTER — OFFICE VISIT (OUTPATIENT)
Dept: INTERNAL MEDICINE | Facility: CLINIC | Age: 33
End: 2023-01-27
Payer: COMMERCIAL

## 2023-01-27 VITALS
HEIGHT: 67 IN | DIASTOLIC BLOOD PRESSURE: 80 MMHG | TEMPERATURE: 98.1 F | BODY MASS INDEX: 41.75 KG/M2 | OXYGEN SATURATION: 96 % | HEART RATE: 123 BPM | SYSTOLIC BLOOD PRESSURE: 120 MMHG | WEIGHT: 266 LBS

## 2023-01-27 DIAGNOSIS — Z01.419 WELL WOMAN EXAM: Primary | ICD-10-CM

## 2023-01-27 DIAGNOSIS — F41.1 GENERALIZED ANXIETY DISORDER: ICD-10-CM

## 2023-01-27 DIAGNOSIS — F60.9 CLUSTER B PERSONALITY DISORDER IN ADULT: ICD-10-CM

## 2023-01-27 DIAGNOSIS — Z11.3 ROUTINE SCREENING FOR STI (SEXUALLY TRANSMITTED INFECTION): ICD-10-CM

## 2023-01-27 LAB
C TRACH RRNA CVX QL NAA+PROBE: NOT DETECTED
CANDIDA SPECIES: NEGATIVE
GARDNERELLA VAGINALIS: NEGATIVE
N GONORRHOEA RRNA SPEC QL NAA+PROBE: NOT DETECTED
T VAGINALIS DNA VAG QL PROBE+SIG AMP: NEGATIVE

## 2023-01-27 PROCEDURE — 87491 CHLMYD TRACH DNA AMP PROBE: CPT | Performed by: INTERNAL MEDICINE

## 2023-01-27 PROCEDURE — 87660 TRICHOMONAS VAGIN DIR PROBE: CPT | Performed by: INTERNAL MEDICINE

## 2023-01-27 PROCEDURE — 87480 CANDIDA DNA DIR PROBE: CPT | Performed by: INTERNAL MEDICINE

## 2023-01-27 PROCEDURE — 3008F BODY MASS INDEX DOCD: CPT | Performed by: INTERNAL MEDICINE

## 2023-01-27 PROCEDURE — 2014F MENTAL STATUS ASSESS: CPT | Performed by: INTERNAL MEDICINE

## 2023-01-27 PROCEDURE — 99395 PREV VISIT EST AGE 18-39: CPT | Performed by: INTERNAL MEDICINE

## 2023-01-27 PROCEDURE — 87591 N.GONORRHOEAE DNA AMP PROB: CPT | Performed by: INTERNAL MEDICINE

## 2023-01-27 PROCEDURE — 87510 GARDNER VAG DNA DIR PROBE: CPT | Performed by: INTERNAL MEDICINE

## 2023-01-27 PROCEDURE — G0123 SCREEN CERV/VAG THIN LAYER: HCPCS | Performed by: INTERNAL MEDICINE

## 2023-01-27 RX ORDER — PANTOPRAZOLE SODIUM 20 MG/1
20 TABLET, DELAYED RELEASE ORAL DAILY
Qty: 90 TABLET | Refills: 1 | Status: SHIPPED | OUTPATIENT
Start: 2023-01-27

## 2023-01-27 RX ORDER — LISINOPRIL 10 MG/1
10 TABLET ORAL DAILY
Qty: 90 TABLET | Refills: 1 | Status: SHIPPED | OUTPATIENT
Start: 2023-01-27

## 2023-01-27 RX ORDER — CLONIDINE HYDROCHLORIDE 0.2 MG/1
0.2 TABLET ORAL NIGHTLY PRN
Qty: 90 TABLET | Refills: 1 | Status: SHIPPED | OUTPATIENT
Start: 2023-01-27

## 2023-01-27 NOTE — PROGRESS NOTES
"Chief Complaint  Cluster B personality disorder in adult  (Follow  up ), Anxiety (Follow up ), Hypertension (Follow up ), Allergic Reaction (Sinus issues that she talked about last time follow up ), Follow-up (2  month ), Headache, and Gynecologic Exam    Subjective          Carina Sanchez presents to [facility]  History of Present Illness     One new sexual partner  Not currently  No vaginal discharge    No family hx of breast/ovarian or colon cancer    States that when she notices more than 8 hrs she wakes up with significant back pain  She notices a lot of stiffness    Objective   Vital Signs:   /80   Pulse (!) 123   Temp 98.1 °F (36.7 °C)   Ht 168.9 cm (66.5\")   Wt 121 kg (266 lb)   SpO2 96%   BMI 42.29 kg/m²     Physical Exam  Vitals reviewed.   Constitutional:       Appearance: Normal appearance. She is well-developed. She is obese.   HENT:      Head: Normocephalic and atraumatic.      Right Ear: External ear normal.      Left Ear: External ear normal.   Eyes:      Conjunctiva/sclera: Conjunctivae normal.      Pupils: Pupils are equal, round, and reactive to light.   Cardiovascular:      Rate and Rhythm: Normal rate and regular rhythm.      Heart sounds: No murmur heard.    No friction rub. No gallop.   Pulmonary:      Effort: Pulmonary effort is normal.      Breath sounds: Normal breath sounds. No wheezing or rhonchi.   Chest:      Comments: Normal breast exam  Genitourinary:     Comments: normal  Skin:     General: Skin is warm and dry.   Neurological:      Mental Status: She is alert and oriented to person, place, and time.   Psychiatric:         Mood and Affect: Affect normal.         Behavior: Behavior normal.         Thought Content: Thought content normal.        Result Review :           Results for orders placed or performed in visit on 12/15/22   POCT pregnancy, urine    Specimen: Urine   Result Value Ref Range    HCG, Urine, QL Negative " Negative    Lot Number [id number]     Internal Positive Control Passed Positive, Passed    Internal Negative Control Passed Negative, Passed    Expiration Date 12-             Procedures        Assessment and Plan    Diagnoses and all orders for this visit:    1. Well woman exam (Primary)  -     IGP, Aptima HPV, Rfx 16 / 18,45; Future  -     Cancel: IGP, Aptima HPV, Rfx 16 / 18,45  -     Gardnerella vaginalis, Trichomonas vaginalis, Candida albicans, DNA - Swab, Vagina  -     Chlamydia trachomatis, Neisseria gonorrhoeae, Trichomonas vaginalis, PCR - Swab, Cervix  -     IGP,rfx Aptima HPV All Pth    2. Routine screening for STI (sexually transmitted infection)  -     Cancel: Chlamydia trachomatis, Neisseria gonorrhoeae, Trichomonas vaginalis, PCR - Swab, Cervix  -     HIV-1 / O / 2 Ag / Antibody 4th Generation  -     Hepatitis C Antibody  -     Hepatitis B Surface Antigen  -     RPR  -     IGP, Aptima HPV, Rfx 16 / 18,45; Future  -     Cancel: IGP, Aptima HPV, Rfx 16 / 18,45  -     Gardnerella vaginalis, Trichomonas vaginalis, Candida albicans, DNA - Swab, Vagina  -     Chlamydia trachomatis, Neisseria gonorrhoeae, Trichomonas vaginalis, PCR - Swab, Cervix  -     IGP,rfx Aptima HPV All Pth    3. Generalized anxiety disorder  Comments:  doing ok, cont vrayalr for now  Orders:  -     cloNIDine (Catapres) 0.2 MG tablet; Take 1 tablet by mouth At Night As Needed (for sleep and agitation).  Dispense: 90 tablet; Refill: 1    4. Cluster B personality disorder in adult (HCC)  -     cloNIDine (Catapres) 0.2 MG tablet; Take 1 tablet by mouth At Night As Needed (for sleep and agitation).  Dispense: 90 tablet; Refill: 1    Other orders  -     lisinopril (PRINIVIL,ZESTRIL) 10 MG tablet; Take 1 tablet by mouth Daily.  Dispense: 90 tablet; Refill: 1  -     pantoprazole (Protonix) 20 MG EC tablet; Take 1 tablet by mouth Daily.  Dispense: 90 tablet; Refill: 1        Class 3 Severe Obesity (BMI >=40). Obesity-related health  conditions include the following: none. Obesity is unchanged. BMI is cont current plan. We discussed portion control and increasing exercise.            Follow Up   Return for Next scheduled follow up.  Patient was given instructions and counseling regarding her condition or for health maintenance advice. Please see specific information pulled into the AVS if appropriate.

## 2023-02-01 LAB
CONV .: NORMAL
CYTOLOGIST CVX/VAG CYTO: NORMAL
CYTOLOGY CVX/VAG DOC CYTO: NORMAL
CYTOLOGY CVX/VAG DOC THIN PREP: NORMAL
DX ICD CODE: NORMAL
HIV 1 & 2 AB SER-IMP: NORMAL
OTHER STN SPEC: NORMAL
STAT OF ADQ CVX/VAG CYTO-IMP: NORMAL

## 2023-02-08 ENCOUNTER — OFFICE VISIT (OUTPATIENT)
Dept: INTERNAL MEDICINE | Facility: CLINIC | Age: 33
End: 2023-02-08
Payer: COMMERCIAL

## 2023-02-08 VITALS
SYSTOLIC BLOOD PRESSURE: 140 MMHG | BODY MASS INDEX: 43.87 KG/M2 | OXYGEN SATURATION: 99 % | HEIGHT: 66 IN | WEIGHT: 273 LBS | TEMPERATURE: 97.7 F | DIASTOLIC BLOOD PRESSURE: 70 MMHG | HEART RATE: 110 BPM

## 2023-02-08 DIAGNOSIS — K21.9 GASTROESOPHAGEAL REFLUX DISEASE, UNSPECIFIED WHETHER ESOPHAGITIS PRESENT: ICD-10-CM

## 2023-02-08 DIAGNOSIS — F33.1 MODERATE EPISODE OF RECURRENT MAJOR DEPRESSIVE DISORDER: Primary | ICD-10-CM

## 2023-02-08 DIAGNOSIS — Z13.220 SCREENING, LIPID: ICD-10-CM

## 2023-02-08 DIAGNOSIS — I10 HYPERTENSION, UNSPECIFIED TYPE: ICD-10-CM

## 2023-02-08 PROCEDURE — 99214 OFFICE O/P EST MOD 30 MIN: CPT | Performed by: INTERNAL MEDICINE

## 2023-02-08 NOTE — PROGRESS NOTES
"Chief Complaint  Follow-up (6 week follow up /\"Cerebral spinal leak\" possibility /And about vraylar meds)    Subjective          Carnia Sanchez presents to [facility]  History of Present Illness    States that she has lost her voice recently  She notices sneezing as well    Still working at [employer]  For the most part really liking her new job  However the last 3 days it has been a little more stressful  She is adjusting to the new sleep schedule  She is trying to take the clonidine to help her with sleep    She is feeling overwhelmed at times  Tearful with her relationships with others  Has been journaling    Still noticing a lot of drainage from her nose  Curious if she could have csf leak as this has gone on for over a year  Worse with leaning forward       Objective   Vital Signs:   /70   Pulse 110   Temp 97.7 °F (36.5 °C)   Ht 166.4 cm (65.5\")   Wt 124 kg (273 lb)   SpO2 99%   BMI 44.74 kg/m²     Physical Exam  Vitals reviewed.   Constitutional:       Appearance: Normal appearance. She is well-developed. She is obese.   HENT:      Head: Normocephalic and atraumatic.      Right Ear: External ear normal.      Left Ear: External ear normal.   Eyes:      Conjunctiva/sclera: Conjunctivae normal.      Pupils: Pupils are equal, round, and reactive to light.   Cardiovascular:      Rate and Rhythm: Normal rate and regular rhythm.      Heart sounds: No murmur heard.    No friction rub. No gallop.   Pulmonary:      Effort: Pulmonary effort is normal.      Breath sounds: Normal breath sounds. No wheezing or rhonchi.   Skin:     General: Skin is warm and dry.   Neurological:      Mental Status: She is alert and oriented to person, place, and time.   Psychiatric:         Mood and Affect: Affect normal.         Behavior: Behavior normal.         Thought Content: Thought content normal.        Result Review :           Results for orders placed or performed in " visit on 01/27/23   Gardnerella vaginalis, Trichomonas vaginalis, Candida albicans, DNA - Swab, Vagina    Specimen: Vagina; Swab   Result Value Ref Range    GARDNERELLA VAGINALIS Negative Negative    TRICHOMONAS VAGINALIS Negative Negative    CANDIDA SPECIES Negative Negative   Chlamydia trachomatis, Neisseria gonorrhoeae, PCR - , Cervix    Specimen: Cervix   Result Value Ref Range    Chlamydia DNA by PCR Not Detected Not Detected     Neisseria gonorrhoeae by PCR Not Detected Not Detected    IGP,rfx Aptima HPV All Pth    Specimen: Cervix; ThinPrep Vial   Result Value Ref Range    Diagnosis Comment     Specimen adequacy: Comment     Clinician Provided ICD-10: Comment     Performed by: Comment     . .     Note: Comment     Method: Comment     Conv .conv Comment             Procedures        Assessment and Plan    Diagnoses and all orders for this visit:    1. Moderate episode of recurrent major depressive disorder (HCC) (Primary)  Comments:  continue to work on journaling and goal setting  Orders:  -     Comprehensive Metabolic Panel  -     CBC & Differential  -     TSH    2. Hypertension, unspecified type  Comments:  a little high, cont to monitor  Orders:  -     Comprehensive Metabolic Panel  -     CBC & Differential  -     TSH    3. Gastroesophageal reflux disease, unspecified whether esophagitis present  Comments:  doing well, cont current meds    4. Screening, lipid  -     Lipid Panel    Other orders  -     Cariprazine HCl (Vraylar) 3 MG capsule capsule; Take 1 capsule by mouth Daily.  Dispense: 90 capsule; Refill: 1      I will look into the csf leak possibility and get back to her    Class 3 Severe Obesity (BMI >=40). Obesity-related health conditions include the following: hypertension. Obesity is unchanged. BMI is is above average; BMI management plan is completed. We discussed portion control and increasing exercise.            Follow Up   No follow-ups on file.  Patient was given instructions and counseling  regarding her condition or for health maintenance advice. Please see specific information pulled into the AVS if appropriate.

## 2023-03-28 NOTE — TELEPHONE ENCOUNTER
We usually do this as a walk-in here.  I have never called it in for anyone before.  Is she wanting to do this at home?

## 2023-03-30 RX ORDER — MEDROXYPROGESTERONE ACETATE 150 MG/ML
INJECTION, SUSPENSION INTRAMUSCULAR
Qty: 1 ML | Refills: 0 | Status: SHIPPED | OUTPATIENT
Start: 2023-03-30

## 2023-03-30 NOTE — TELEPHONE ENCOUNTER
Left detailed message for patient to call back.     HUB PLEASE WARM TRANSFER WHEN PATIENT CALLS BACK.

## 2023-04-05 ENCOUNTER — OFFICE VISIT (OUTPATIENT)
Dept: INTERNAL MEDICINE | Facility: CLINIC | Age: 33
End: 2023-04-05
Payer: COMMERCIAL

## 2023-04-05 VITALS
OXYGEN SATURATION: 99 % | DIASTOLIC BLOOD PRESSURE: 102 MMHG | HEART RATE: 97 BPM | WEIGHT: 273 LBS | HEIGHT: 66 IN | TEMPERATURE: 98 F | RESPIRATION RATE: 15 BRPM | SYSTOLIC BLOOD PRESSURE: 158 MMHG | BODY MASS INDEX: 43.87 KG/M2

## 2023-04-05 DIAGNOSIS — J34.89 NASAL DRAINAGE: ICD-10-CM

## 2023-04-05 DIAGNOSIS — R51.9 CHRONIC INTRACTABLE HEADACHE, UNSPECIFIED HEADACHE TYPE: ICD-10-CM

## 2023-04-05 DIAGNOSIS — Z72.0 TOBACCO ABUSE: ICD-10-CM

## 2023-04-05 DIAGNOSIS — Z00.00 ANNUAL PHYSICAL EXAM: Primary | ICD-10-CM

## 2023-04-05 DIAGNOSIS — H91.93 BILATERAL HEARING LOSS, UNSPECIFIED HEARING LOSS TYPE: ICD-10-CM

## 2023-04-05 DIAGNOSIS — G89.29 CHRONIC INTRACTABLE HEADACHE, UNSPECIFIED HEADACHE TYPE: ICD-10-CM

## 2023-04-05 DIAGNOSIS — S41.111D LACERATION OF RIGHT UPPER EXTREMITY, SUBSEQUENT ENCOUNTER: ICD-10-CM

## 2023-04-05 DIAGNOSIS — I10 PRIMARY HYPERTENSION: ICD-10-CM

## 2023-04-05 DIAGNOSIS — F33.1 MODERATE EPISODE OF RECURRENT MAJOR DEPRESSIVE DISORDER: ICD-10-CM

## 2023-04-05 DIAGNOSIS — L70.0 CYSTIC ACNE: ICD-10-CM

## 2023-04-05 LAB
ALBUMIN SERPL-MCNC: 4.7 G/DL (ref 3.5–5.2)
ALBUMIN/GLOB SERPL: 1.7 G/DL
ALP SERPL-CCNC: 89 U/L (ref 39–117)
ALT SERPL W P-5'-P-CCNC: 21 U/L (ref 1–33)
ANION GAP SERPL CALCULATED.3IONS-SCNC: 11.1 MMOL/L (ref 5–15)
AST SERPL-CCNC: 19 U/L (ref 1–32)
BASOPHILS # BLD AUTO: 0.05 10*3/MM3 (ref 0–0.2)
BASOPHILS NFR BLD AUTO: 0.4 % (ref 0–1.5)
BILIRUB SERPL-MCNC: <0.2 MG/DL (ref 0–1.2)
BUN SERPL-MCNC: 15 MG/DL (ref 6–20)
BUN/CREAT SERPL: 12 (ref 7–25)
CALCIUM SPEC-SCNC: 9.8 MG/DL (ref 8.6–10.5)
CHLORIDE SERPL-SCNC: 104 MMOL/L (ref 98–107)
CHOLEST SERPL-MCNC: 192 MG/DL (ref 0–200)
CO2 SERPL-SCNC: 23.9 MMOL/L (ref 22–29)
CREAT SERPL-MCNC: 1.25 MG/DL (ref 0.57–1)
DEPRECATED RDW RBC AUTO: 41.4 FL (ref 37–54)
EGFRCR SERPLBLD CKD-EPI 2021: 58.5 ML/MIN/1.73
EOSINOPHIL # BLD AUTO: 0.16 10*3/MM3 (ref 0–0.4)
EOSINOPHIL NFR BLD AUTO: 1.3 % (ref 0.3–6.2)
ERYTHROCYTE [DISTWIDTH] IN BLOOD BY AUTOMATED COUNT: 14.2 % (ref 12.3–15.4)
GLOBULIN UR ELPH-MCNC: 2.7 GM/DL
GLUCOSE SERPL-MCNC: 82 MG/DL (ref 65–99)
HBV SURFACE AG SERPL QL IA: NORMAL
HCT VFR BLD AUTO: 38.8 % (ref 34–46.6)
HCV AB SER DONR QL: NORMAL
HDLC SERPL-MCNC: 45 MG/DL (ref 40–60)
HGB BLD-MCNC: 12.2 G/DL (ref 12–15.9)
HIV1+2 AB SER QL: NORMAL
IMM GRANULOCYTES # BLD AUTO: 0.06 10*3/MM3 (ref 0–0.05)
IMM GRANULOCYTES NFR BLD AUTO: 0.5 % (ref 0–0.5)
LDLC SERPL CALC-MCNC: 125 MG/DL (ref 0–100)
LDLC/HDLC SERPL: 2.72 {RATIO}
LYMPHOCYTES # BLD AUTO: 3.42 10*3/MM3 (ref 0.7–3.1)
LYMPHOCYTES NFR BLD AUTO: 28.2 % (ref 19.6–45.3)
MCH RBC QN AUTO: 25.2 PG (ref 26.6–33)
MCHC RBC AUTO-ENTMCNC: 31.4 G/DL (ref 31.5–35.7)
MCV RBC AUTO: 80.2 FL (ref 79–97)
MONOCYTES # BLD AUTO: 0.83 10*3/MM3 (ref 0.1–0.9)
MONOCYTES NFR BLD AUTO: 6.8 % (ref 5–12)
NEUTROPHILS NFR BLD AUTO: 62.8 % (ref 42.7–76)
NEUTROPHILS NFR BLD AUTO: 7.6 10*3/MM3 (ref 1.7–7)
NRBC BLD AUTO-RTO: 0 /100 WBC (ref 0–0.2)
PLATELET # BLD AUTO: 419 10*3/MM3 (ref 140–450)
PMV BLD AUTO: 10 FL (ref 6–12)
POTASSIUM SERPL-SCNC: 3.8 MMOL/L (ref 3.5–5.2)
PROT SERPL-MCNC: 7.4 G/DL (ref 6–8.5)
RBC # BLD AUTO: 4.84 10*6/MM3 (ref 3.77–5.28)
SODIUM SERPL-SCNC: 139 MMOL/L (ref 136–145)
TRIGL SERPL-MCNC: 123 MG/DL (ref 0–150)
VLDLC SERPL-MCNC: 22 MG/DL (ref 5–40)
WBC NRBC COR # BLD: 12.12 10*3/MM3 (ref 3.4–10.8)

## 2023-04-05 PROCEDURE — 80061 LIPID PANEL: CPT | Performed by: INTERNAL MEDICINE

## 2023-04-05 PROCEDURE — 80050 GENERAL HEALTH PANEL: CPT | Performed by: INTERNAL MEDICINE

## 2023-04-05 PROCEDURE — 86592 SYPHILIS TEST NON-TREP QUAL: CPT | Performed by: INTERNAL MEDICINE

## 2023-04-05 PROCEDURE — 86803 HEPATITIS C AB TEST: CPT | Performed by: INTERNAL MEDICINE

## 2023-04-05 PROCEDURE — G0432 EIA HIV-1/HIV-2 SCREEN: HCPCS | Performed by: INTERNAL MEDICINE

## 2023-04-05 PROCEDURE — 87340 HEPATITIS B SURFACE AG IA: CPT | Performed by: INTERNAL MEDICINE

## 2023-04-05 RX ORDER — TRETINOIN 1 MG/G
1 CREAM TOPICAL NIGHTLY
Qty: 45 G | Refills: 0 | Status: SHIPPED | OUTPATIENT
Start: 2023-04-05

## 2023-04-05 RX ORDER — CLOBETASOL PROPIONATE 0.5 MG/G
OINTMENT TOPICAL
Qty: 60 G | Refills: 1 | Status: SHIPPED | OUTPATIENT
Start: 2023-04-05

## 2023-04-05 NOTE — PROGRESS NOTES
"Chief Complaint  Depression, Hypertension (BP elevated today. 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   Constitutional:       Appearance: Normal appearance. She is well-developed.   HENT:      Head: Normocephalic and atraumatic.      Right Ear: External ear normal.      Left Ear: External ear normal.   Eyes:      Conjunctiva/sclera: Conjunctivae normal.      Pupils: Pupils are equal, round, and reactive to light.   Cardiovascular:      Rate and Rhythm: Normal rate and regular rhythm.      Heart sounds: No murmur heard.    No friction rub. No gallop.   Pulmonary:      Effort: Pulmonary effort is normal.      Breath sounds: Normal breath sounds. No wheezing or rhonchi.   Skin:     General: Skin is warm and dry.   Neurological:      Mental Status: She is alert and oriented to person, place, and time.   Psychiatric:         Mood and Affect: Affect normal.         Behavior: Behavior normal.         Thought Content: Thought content normal. "        Result Review :           Results for orders placed or performed in visit on 01/27/23   Gardnerella vaginalis, Trichomonas vaginalis, Candida albicans, DNA - Swab, Vagina    Specimen: Vagina; Swab   Result Value Ref Range    GARDNERELLA VAGINALIS Negative Negative    TRICHOMONAS VAGINALIS Negative Negative    CANDIDA SPECIES Negative Negative   Chlamydia trachomatis, Neisseria gonorrhoeae, PCR - , Cervix    Specimen: Cervix   Result Value Ref Range    Chlamydia DNA by PCR Not Detected Not Detected     Neisseria gonorrhoeae by PCR Not Detected Not Detected    IGP,rfx Aptima HPV All Pth    Specimen: Cervix; ThinPrep Vial   Result Value Ref Range    Diagnosis Comment     Specimen adequacy: Comment     Clinician Provided ICD-10: Comment     Performed by: Comment     . .     Note: Comment     Method: Comment     Conv .conv Comment             Procedures        Assessment and Plan    Diagnoses and all orders for this visit:    1. Annual physical exam (Primary)  Comments:  counseled on diet and exercise    2. Primary hypertension  Comments:  states that when she takes meds bp does well therefore for now will just encourage her to take meds    3. Nasal drainage  -     MRI Brain With & Without Contrast; Future  -     Ambulatory Referral to ENT (Otolaryngology)    4. Chronic intractable headache, unspecified headache type  Comments:  symptoms somewhat concerning for idiopathic intracranial hypertension  Orders:  -     MRI Brain With & Without Contrast; Future    5. Bilateral hearing loss, unspecified hearing loss type  Comments:  will refer to ENT for further work up  Orders:  -     Ambulatory Referral to ENT (Otolaryngology)    6. Moderate episode of recurrent major depressive disorder  Comments:  cont current meds    7. Cystic acne  Comments:  will try retionl    8. Tobacco abuse  Comments:  discussed possible pcv in the future and needing to quit    9. Laceration of right upper extremity, subsequent  encounter  Comments:  3 cuts; starting to heal but will give tdap today    Other orders  -     clobetasol (TEMOVATE) 0.05 % ointment; Apply to heels twice a day  Dispense: 60 g; Refill: 1  -     tretinoin (Retin-A) 0.1 % cream; Apply 1 application topically to the appropriate area as directed Every Night.  Dispense: 45 g; Refill: 0  -     Tdap Vaccine Greater Than or Equal To 8yo IM        Class 3 Severe Obesity (BMI >=40). Obesity-related health conditions include the following: hypertension. Obesity is unchanged. BMI is is above average; BMI management plan is completed. We discussed portion control and increasing exercise.            Follow Up   Return in about 3 months (around 7/5/2023).  Patient was given instructions and counseling regarding her condition or for health maintenance advice. Please see specific information pulled into the AVS if appropriate.

## 2023-04-06 LAB
RPR SER QL: NORMAL
TSH SERPL DL<=0.05 MIU/L-ACNC: 1.18 UIU/ML (ref 0.27–4.2)

## 2023-04-25 ENCOUNTER — HOSPITAL ENCOUNTER (OUTPATIENT)
Dept: MRI IMAGING | Facility: HOSPITAL | Age: 33
Discharge: HOME | End: 2023-04-25
Admitting: INTERNAL MEDICINE
Payer: COMMERCIAL

## 2023-04-25 DIAGNOSIS — J34.89 NASAL DRAINAGE: ICD-10-CM

## 2023-04-25 DIAGNOSIS — R51.9 CHRONIC INTRACTABLE HEADACHE, UNSPECIFIED HEADACHE TYPE: ICD-10-CM

## 2023-04-25 DIAGNOSIS — G89.29 CHRONIC INTRACTABLE HEADACHE, UNSPECIFIED HEADACHE TYPE: ICD-10-CM

## 2023-04-25 PROCEDURE — 70553 MRI BRAIN STEM W/O & W/DYE: CPT

## 2023-04-25 PROCEDURE — A9577 INJ MULTIHANCE: HCPCS | Performed by: INTERNAL MEDICINE

## 2023-04-25 PROCEDURE — 0 GADOBENATE DIMEGLUMINE 529 MG/ML SOLUTION: Performed by: INTERNAL MEDICINE

## 2023-04-25 RX ADMIN — GADOBENATE DIMEGLUMINE 20 ML: 529 INJECTION, SOLUTION INTRAVENOUS at 12:40

## 2023-04-28 RX ORDER — FEXOFENADINE HCL 180 MG/1
TABLET ORAL
Qty: 90 TABLET | Refills: 1 | Status: SHIPPED | OUTPATIENT
Start: 2023-04-28

## 2023-06-19 RX ORDER — CLOBETASOL PROPIONATE 0.5 MG/G
OINTMENT TOPICAL
Qty: 60 G | Refills: 1 | Status: SHIPPED | OUTPATIENT
Start: 2023-06-19

## 2023-08-17 DIAGNOSIS — F60.9 CLUSTER B PERSONALITY DISORDER IN ADULT: ICD-10-CM

## 2023-08-17 DIAGNOSIS — F41.1 GENERALIZED ANXIETY DISORDER: ICD-10-CM

## 2023-08-17 RX ORDER — PANTOPRAZOLE SODIUM 20 MG/1
TABLET, DELAYED RELEASE ORAL
Qty: 90 TABLET | Refills: 1 | Status: SHIPPED | OUTPATIENT
Start: 2023-08-17

## 2023-08-17 RX ORDER — CLONIDINE HYDROCHLORIDE 0.2 MG/1
TABLET ORAL
Qty: 90 TABLET | Refills: 1 | Status: SHIPPED | OUTPATIENT
Start: 2023-08-17

## 2023-08-17 RX ORDER — CARIPRAZINE 3 MG/1
CAPSULE, GELATIN COATED ORAL
Qty: 90 CAPSULE | Refills: 1 | Status: SHIPPED | OUTPATIENT
Start: 2023-08-17

## 2023-08-17 RX ORDER — LISINOPRIL 10 MG/1
TABLET ORAL
Qty: 90 TABLET | Refills: 1 | Status: SHIPPED | OUTPATIENT
Start: 2023-08-17

## 2023-09-27 ENCOUNTER — OFFICE VISIT (OUTPATIENT)
Dept: INTERNAL MEDICINE | Facility: CLINIC | Age: 33
End: 2023-09-27
Payer: COMMERCIAL

## 2023-09-27 VITALS
WEIGHT: 287.8 LBS | OXYGEN SATURATION: 95 % | HEART RATE: 112 BPM | TEMPERATURE: 96.9 F | SYSTOLIC BLOOD PRESSURE: 110 MMHG | DIASTOLIC BLOOD PRESSURE: 78 MMHG | BODY MASS INDEX: 46.25 KG/M2 | HEIGHT: 66 IN

## 2023-09-27 DIAGNOSIS — F90.2 ATTENTION DEFICIT HYPERACTIVITY DISORDER (ADHD), COMBINED TYPE: Primary | ICD-10-CM

## 2023-09-27 DIAGNOSIS — I10 PRIMARY HYPERTENSION: ICD-10-CM

## 2023-09-27 DIAGNOSIS — Z23 NEED FOR INFLUENZA VACCINATION: ICD-10-CM

## 2023-09-27 DIAGNOSIS — N90.89 SKIN TAG OF LABIA: ICD-10-CM

## 2023-09-27 DIAGNOSIS — F60.9 CLUSTER B PERSONALITY DISORDER IN ADULT: ICD-10-CM

## 2023-09-27 DIAGNOSIS — F41.1 GENERALIZED ANXIETY DISORDER: ICD-10-CM

## 2023-09-27 LAB
ANION GAP SERPL CALCULATED.3IONS-SCNC: 13 MMOL/L (ref 5–15)
BASOPHILS # BLD AUTO: 0.05 10*3/MM3 (ref 0–0.2)
BASOPHILS NFR BLD AUTO: 0.4 % (ref 0–1.5)
BUN SERPL-MCNC: 20 MG/DL (ref 6–20)
BUN/CREAT SERPL: 16.8 (ref 7–25)
CALCIUM SPEC-SCNC: 9.5 MG/DL (ref 8.6–10.5)
CHLORIDE SERPL-SCNC: 103 MMOL/L (ref 98–107)
CO2 SERPL-SCNC: 23 MMOL/L (ref 22–29)
CREAT SERPL-MCNC: 1.19 MG/DL (ref 0.57–1)
DEPRECATED RDW RBC AUTO: 39.9 FL (ref 37–54)
EGFRCR SERPLBLD CKD-EPI 2021: 62 ML/MIN/1.73
EOSINOPHIL # BLD AUTO: 0.18 10*3/MM3 (ref 0–0.4)
EOSINOPHIL NFR BLD AUTO: 1.4 % (ref 0.3–6.2)
ERYTHROCYTE [DISTWIDTH] IN BLOOD BY AUTOMATED COUNT: 14.1 % (ref 12.3–15.4)
GLUCOSE SERPL-MCNC: 98 MG/DL (ref 65–99)
HCT VFR BLD AUTO: 36.4 % (ref 34–46.6)
HGB BLD-MCNC: 11.6 G/DL (ref 12–15.9)
IMM GRANULOCYTES # BLD AUTO: 0.07 10*3/MM3 (ref 0–0.05)
IMM GRANULOCYTES NFR BLD AUTO: 0.5 % (ref 0–0.5)
LYMPHOCYTES # BLD AUTO: 3.87 10*3/MM3 (ref 0.7–3.1)
LYMPHOCYTES NFR BLD AUTO: 29.8 % (ref 19.6–45.3)
MCH RBC QN AUTO: 25.3 PG (ref 26.6–33)
MCHC RBC AUTO-ENTMCNC: 31.9 G/DL (ref 31.5–35.7)
MCV RBC AUTO: 79.3 FL (ref 79–97)
MONOCYTES # BLD AUTO: 0.97 10*3/MM3 (ref 0.1–0.9)
MONOCYTES NFR BLD AUTO: 7.5 % (ref 5–12)
NEUTROPHILS NFR BLD AUTO: 60.4 % (ref 42.7–76)
NEUTROPHILS NFR BLD AUTO: 7.84 10*3/MM3 (ref 1.7–7)
NRBC BLD AUTO-RTO: 0 /100 WBC (ref 0–0.2)
PLATELET # BLD AUTO: 380 10*3/MM3 (ref 140–450)
PMV BLD AUTO: 9.7 FL (ref 6–12)
POTASSIUM SERPL-SCNC: 4.2 MMOL/L (ref 3.5–5.2)
RBC # BLD AUTO: 4.59 10*6/MM3 (ref 3.77–5.28)
SODIUM SERPL-SCNC: 139 MMOL/L (ref 136–145)
WBC NRBC COR # BLD: 12.98 10*3/MM3 (ref 3.4–10.8)

## 2023-09-27 PROCEDURE — 85025 COMPLETE CBC W/AUTO DIFF WBC: CPT | Performed by: INTERNAL MEDICINE

## 2023-09-27 PROCEDURE — 80048 BASIC METABOLIC PNL TOTAL CA: CPT | Performed by: INTERNAL MEDICINE

## 2023-09-27 RX ORDER — TRETINOIN 1 MG/G
1 CREAM TOPICAL NIGHTLY
Qty: 45 G | Refills: 0 | Status: SHIPPED | OUTPATIENT
Start: 2023-09-27

## 2023-09-27 RX ORDER — PANTOPRAZOLE SODIUM 20 MG/1
20 TABLET, DELAYED RELEASE ORAL DAILY
Qty: 90 TABLET | Refills: 1 | Status: SHIPPED | OUTPATIENT
Start: 2023-09-27

## 2023-09-27 RX ORDER — CLONIDINE HYDROCHLORIDE 0.2 MG/1
TABLET ORAL
Qty: 90 TABLET | Refills: 1 | Status: CANCELLED | OUTPATIENT
Start: 2023-09-27

## 2023-09-27 RX ORDER — MEDROXYPROGESTERONE ACETATE 150 MG/ML
150 INJECTION, SUSPENSION INTRAMUSCULAR
Qty: 1 ML | Refills: 0 | Status: SHIPPED | OUTPATIENT
Start: 2023-09-27

## 2023-09-27 RX ORDER — LISDEXAMFETAMINE DIMESYLATE CAPSULES 40 MG/1
40 CAPSULE ORAL EVERY MORNING
Qty: 30 CAPSULE | Refills: 0 | Status: SHIPPED | OUTPATIENT
Start: 2023-09-27

## 2023-09-27 RX ORDER — LISINOPRIL 10 MG/1
10 TABLET ORAL DAILY
Qty: 90 TABLET | Refills: 1 | Status: SHIPPED | OUTPATIENT
Start: 2023-09-27

## 2023-09-27 RX ORDER — FEXOFENADINE HCL 180 MG/1
180 TABLET ORAL DAILY
Qty: 90 TABLET | Refills: 1 | Status: SHIPPED | OUTPATIENT
Start: 2023-09-27

## 2023-09-27 RX ORDER — AZELASTINE HYDROCHLORIDE, FLUTICASONE PROPIONATE 137; 50 UG/1; UG/1
2 SPRAY, METERED NASAL DAILY
Qty: 23 G | Refills: 3 | Status: SHIPPED | OUTPATIENT
Start: 2023-09-27

## 2023-10-10 ENCOUNTER — TELEPHONE (OUTPATIENT)
Dept: INTERNAL MEDICINE | Facility: CLINIC | Age: 33
End: 2023-10-10

## 2023-10-10 NOTE — TELEPHONE ENCOUNTER
Provider: GALDINO DIETZ    Caller: ENEDELIA COOK    Relationship to Patient: SELF    Pharmacy: [facility], [place] - [place], [place] - [address] [facility] [address]  - [phone]  - [phone] FX     Phone Number: [phone]     Reason for Call: PHARMACY TRIED TO RUN THE PRESCRIPTION THROUGH FOR lisdexamfetamine (Vyvanse) 40 MG capsule  ABOUT 5 MINUTES AGO AND SAID IT STILL WILL NOT GO THROUGH.    PLEASE CALL AND ADVISE    HUB UNABLE TO WARM TRANSFER

## 2023-10-11 ENCOUNTER — PRIOR AUTHORIZATION (OUTPATIENT)
Dept: INTERNAL MEDICINE | Facility: CLINIC | Age: 33
End: 2023-10-11
Payer: COMMERCIAL

## 2023-10-11 NOTE — TELEPHONE ENCOUNTER
Relay    Pa was started for vyvanse on oct 2 2023    Pa was approved for vyvanse and message was sent to pt concerning this    The request has been approved. The authorization is effective from 10/02/2023 to 10/01/2024, as long as the member is enrolled in their current health plan. The request was approved as submitted. A written notification letter will follow with additional details.

## 2023-10-11 NOTE — TELEPHONE ENCOUNTER
HUB STAFF ATTEMPTED TO FOLLOW WARM TRANSFER PROCESS AND WAS UNSUCCESSFUL.     Caller: Carina Sanchez    Relationship to patient: Self    Best call back number: [phone]    Patient is needing: PATIENT STATED THAT THE PHARMACY HAS ATTEMPTED TO FILL HER VYVANSE AND THAT THE PRIOR AUTHORIZATION IS NOT BEING RECOGNIZED EVEN AFTER SEVERAL ATTEMPTS.   PATIENT REQUESTING CALL BACK TO DISCUSS.     [facility], [place] - [address] [facility] [address]  - [phone] [facility] [phone] FX

## 2023-10-18 ENCOUNTER — OFFICE VISIT (OUTPATIENT)
Dept: OBSTETRICS AND GYNECOLOGY | Facility: CLINIC | Age: 33
End: 2023-10-18
Payer: COMMERCIAL

## 2023-10-18 VITALS
DIASTOLIC BLOOD PRESSURE: 84 MMHG | WEIGHT: 290 LBS | BODY MASS INDEX: 46.61 KG/M2 | HEIGHT: 66 IN | SYSTOLIC BLOOD PRESSURE: 140 MMHG | HEART RATE: 117 BPM

## 2023-10-18 DIAGNOSIS — N90.89 SKIN TAG OF FEMALE PERINEUM: Primary | ICD-10-CM

## 2023-10-18 PROCEDURE — 88304 TISSUE EXAM BY PATHOLOGIST: CPT | Performed by: NURSE PRACTITIONER

## 2023-10-18 NOTE — PROGRESS NOTES
"GYN Visit    CC:   Chief Complaint   Patient presents with    Skin Tag on Labia       HPI:   33 y.o. Contraception or HRT: Contraception:  None    Has a skin tag on her labial lip, has gradually gotten bigger.  Will now catch on her clothes and become painful.  Has been there 2-3 years.  Interested in having it removed.     History: PMHx, Meds, Allergies, PSHx, Social Hx, and POBHx all reviewed and updated.    Review of Systems   Constitutional: Negative.    Genitourinary:         Skin tag       PHYSICAL EXAM:  /84   Pulse 117   Ht 167.6 cm (66\")   Wt 132 kg (290 lb)   BMI 46.81 kg/m²      Physical Exam  Vitals and nursing note reviewed. Exam conducted with a chaperone present.   Constitutional:       Appearance: Normal appearance. She is well-developed and well-groomed.   HENT:      Head: Normocephalic.   Eyes:      Pupils: Pupils are equal, round, and reactive to light.   Genitourinary:     General: Normal vulva.      Exam position: Lithotomy position.          Comments: After obtaining written consent, area cleaned with betadine and injected with 1% lidocaine.  Skin tag grasped with hemostat and #11 blade used to excise tag.  Bleeding controlled with AgNO3, antibiotic ointment and light guaze dressing applied.  Patient tolerated procedure well.   Skin:     General: Skin is warm and dry.   Neurological:      General: No focal deficit present.      Mental Status: She is alert.         ASSESSMENT AND PLAN:  Diagnoses and all orders for this visit:    1. Skin tag of female perineum (Primary)  -     Tissue Pathology Exam        Counseling:  Call with any concerns    Follow Up:  Return for as needed.          Geoffrey Barton, APRN  10/18/2023    [facility] [place] [address]  [facility]  [address] [place] [address]  Dept: [phone]  Dept Fax: [phone]  Loc: [phone]     "

## 2023-10-20 ENCOUNTER — PATIENT ROUNDING (BHMG ONLY) (OUTPATIENT)
Dept: OBSTETRICS AND GYNECOLOGY | Facility: CLINIC | Age: 33
End: 2023-10-20
Payer: COMMERCIAL

## 2023-10-20 NOTE — PROGRESS NOTES
My name is Kadie Vieyra, Practice Manager of [facility].     I want to officially welcome you to our practice and ask about your recent visit.     Please share with me what went well with your recent visit.     We are always looking for ways to make each experience better.  Do you have any suggestions on ways we can improve?     Overall were you satisfied with your visit?     Your experience is very important to us.  You may receive an email regarding a survey.  Please take a moment to complete.  This will help us to improve.    I appreciate you taking the time to answer these questions.     Thank you and have a Great day.   Kadie

## 2023-10-23 LAB
CYTO UR: NORMAL
LAB AP CASE REPORT: NORMAL
LAB AP CLINICAL INFORMATION: NORMAL
PATH REPORT.FINAL DX SPEC: NORMAL
PATH REPORT.GROSS SPEC: NORMAL

## 2023-12-16 ENCOUNTER — PATIENT MESSAGE (OUTPATIENT)
Dept: INTERNAL MEDICINE | Facility: CLINIC | Age: 33
End: 2023-12-16
Payer: COMMERCIAL

## 2023-12-17 NOTE — PROGRESS NOTES
Patient mother called on-call service at approx 1015 on 12/17/23. Patient reports starting with cough, congestion, sinus pain yesterday. She tested positive for covid yesterday on home test as well. Sick contacts include mother with similar symptoms. Patient does smoke and is obese. Patient is amenable to paxlovid after discussion of risks and benefits. Med list reviewed. Will send Prescription.

## 2023-12-18 NOTE — TELEPHONE ENCOUNTER
From: Carina Sanchez  To: Amber Byrne  Sent: 12/16/2023 11:01 AM EST  Subject: URGENT    Hi, Dr. Byrne. I just tested positive for COVID. AGAIN. I just wanted to check about the possibility of getting that COVID medication or if I would qualify? :(

## 2024-01-04 RX ORDER — MEDROXYPROGESTERONE ACETATE 150 MG/ML
INJECTION, SUSPENSION INTRAMUSCULAR
Qty: 1 ML | Refills: 0 | Status: SHIPPED | OUTPATIENT
Start: 2024-01-04

## 2024-04-15 RX ORDER — MEDROXYPROGESTERONE ACETATE 150 MG/ML
INJECTION, SUSPENSION INTRAMUSCULAR
Qty: 1 ML | Refills: 0 | Status: SHIPPED | OUTPATIENT
Start: 2024-04-15

## 2024-05-10 ENCOUNTER — OFFICE VISIT (OUTPATIENT)
Dept: INTERNAL MEDICINE | Facility: CLINIC | Age: 34
End: 2024-05-10
Payer: COMMERCIAL

## 2024-05-10 VITALS
SYSTOLIC BLOOD PRESSURE: 144 MMHG | BODY MASS INDEX: 42.4 KG/M2 | HEART RATE: 100 BPM | TEMPERATURE: 96.8 F | HEIGHT: 66 IN | DIASTOLIC BLOOD PRESSURE: 90 MMHG | OXYGEN SATURATION: 99 % | RESPIRATION RATE: 18 BRPM | WEIGHT: 263.8 LBS

## 2024-05-10 DIAGNOSIS — F33.1 MODERATE EPISODE OF RECURRENT MAJOR DEPRESSIVE DISORDER: ICD-10-CM

## 2024-05-10 DIAGNOSIS — K21.9 GASTROESOPHAGEAL REFLUX DISEASE, UNSPECIFIED WHETHER ESOPHAGITIS PRESENT: ICD-10-CM

## 2024-05-10 DIAGNOSIS — I10 PRIMARY HYPERTENSION: Primary | ICD-10-CM

## 2024-05-10 DIAGNOSIS — Z13.220 SCREENING, LIPID: ICD-10-CM

## 2024-05-10 DIAGNOSIS — F41.1 GENERALIZED ANXIETY DISORDER: ICD-10-CM

## 2024-05-10 DIAGNOSIS — M62.838 MUSCLE SPASM: ICD-10-CM

## 2024-05-10 DIAGNOSIS — D64.9 ANEMIA, UNSPECIFIED TYPE: ICD-10-CM

## 2024-05-10 LAB
BASOPHILS # BLD AUTO: 0.04 10*3/MM3 (ref 0–0.2)
BASOPHILS NFR BLD AUTO: 0.3 % (ref 0–1.5)
DEPRECATED RDW RBC AUTO: 39.2 FL (ref 37–54)
EOSINOPHIL # BLD AUTO: 0.14 10*3/MM3 (ref 0–0.4)
EOSINOPHIL NFR BLD AUTO: 1.2 % (ref 0.3–6.2)
ERYTHROCYTE [DISTWIDTH] IN BLOOD BY AUTOMATED COUNT: 14.3 % (ref 12.3–15.4)
HCT VFR BLD AUTO: 42.1 % (ref 34–46.6)
HGB BLD-MCNC: 13.7 G/DL (ref 12–15.9)
IMM GRANULOCYTES # BLD AUTO: 0.06 10*3/MM3 (ref 0–0.05)
IMM GRANULOCYTES NFR BLD AUTO: 0.5 % (ref 0–0.5)
LYMPHOCYTES # BLD AUTO: 2.95 10*3/MM3 (ref 0.7–3.1)
LYMPHOCYTES NFR BLD AUTO: 25 % (ref 19.6–45.3)
MCH RBC QN AUTO: 25.4 PG (ref 26.6–33)
MCHC RBC AUTO-ENTMCNC: 32.5 G/DL (ref 31.5–35.7)
MCV RBC AUTO: 78 FL (ref 79–97)
MONOCYTES # BLD AUTO: 0.75 10*3/MM3 (ref 0.1–0.9)
MONOCYTES NFR BLD AUTO: 6.4 % (ref 5–12)
NEUTROPHILS NFR BLD AUTO: 66.6 % (ref 42.7–76)
NEUTROPHILS NFR BLD AUTO: 7.86 10*3/MM3 (ref 1.7–7)
NRBC BLD AUTO-RTO: 0 /100 WBC (ref 0–0.2)
PLATELET # BLD AUTO: 393 10*3/MM3 (ref 140–450)
PMV BLD AUTO: 9.5 FL (ref 6–12)
RBC # BLD AUTO: 5.4 10*6/MM3 (ref 3.77–5.28)
WBC NRBC COR # BLD AUTO: 11.8 10*3/MM3 (ref 3.4–10.8)

## 2024-05-10 PROCEDURE — 99214 OFFICE O/P EST MOD 30 MIN: CPT | Performed by: INTERNAL MEDICINE

## 2024-05-10 PROCEDURE — 84466 ASSAY OF TRANSFERRIN: CPT | Performed by: INTERNAL MEDICINE

## 2024-05-10 PROCEDURE — 83735 ASSAY OF MAGNESIUM: CPT | Performed by: INTERNAL MEDICINE

## 2024-05-10 PROCEDURE — 83540 ASSAY OF IRON: CPT | Performed by: INTERNAL MEDICINE

## 2024-05-10 PROCEDURE — 80050 GENERAL HEALTH PANEL: CPT | Performed by: INTERNAL MEDICINE

## 2024-05-10 PROCEDURE — 80061 LIPID PANEL: CPT | Performed by: INTERNAL MEDICINE

## 2024-05-10 PROCEDURE — 82306 VITAMIN D 25 HYDROXY: CPT | Performed by: INTERNAL MEDICINE

## 2024-05-10 PROCEDURE — 82746 ASSAY OF FOLIC ACID SERUM: CPT | Performed by: INTERNAL MEDICINE

## 2024-05-10 PROCEDURE — 82607 VITAMIN B-12: CPT | Performed by: INTERNAL MEDICINE

## 2024-05-10 RX ORDER — LISINOPRIL 10 MG/1
10 TABLET ORAL DAILY
Qty: 90 TABLET | Refills: 1 | Status: SHIPPED | OUTPATIENT
Start: 2024-05-10

## 2024-05-10 RX ORDER — PANTOPRAZOLE SODIUM 20 MG/1
20 TABLET, DELAYED RELEASE ORAL DAILY
Qty: 90 TABLET | Refills: 1 | Status: SHIPPED | OUTPATIENT
Start: 2024-05-10

## 2024-05-10 RX ORDER — FEXOFENADINE HCL 180 MG/1
180 TABLET ORAL DAILY
Qty: 90 TABLET | Refills: 1 | Status: SHIPPED | OUTPATIENT
Start: 2024-05-10

## 2024-05-10 RX ORDER — DESVENLAFAXINE 25 MG/1
25 TABLET, EXTENDED RELEASE ORAL DAILY
Qty: 90 TABLET | Refills: 1 | Status: SHIPPED | OUTPATIENT
Start: 2024-05-10

## 2024-05-10 RX ORDER — TRETINOIN 1 MG/G
1 CREAM TOPICAL NIGHTLY
Qty: 45 G | Refills: 0 | Status: SHIPPED | OUTPATIENT
Start: 2024-05-10

## 2024-05-11 LAB
25(OH)D3 SERPL-MCNC: 22.2 NG/ML (ref 30–100)
ALBUMIN SERPL-MCNC: 4.5 G/DL (ref 3.5–5.2)
ALBUMIN/GLOB SERPL: 1.6 G/DL
ALP SERPL-CCNC: 98 U/L (ref 39–117)
ALT SERPL W P-5'-P-CCNC: 24 U/L (ref 1–33)
ANION GAP SERPL CALCULATED.3IONS-SCNC: 11.2 MMOL/L (ref 5–15)
AST SERPL-CCNC: 16 U/L (ref 1–32)
BILIRUB SERPL-MCNC: 0.3 MG/DL (ref 0–1.2)
BUN SERPL-MCNC: 9 MG/DL (ref 6–20)
BUN/CREAT SERPL: 11.4 (ref 7–25)
CALCIUM SPEC-SCNC: 9.4 MG/DL (ref 8.6–10.5)
CHLORIDE SERPL-SCNC: 106 MMOL/L (ref 98–107)
CHOLEST SERPL-MCNC: 180 MG/DL (ref 0–200)
CO2 SERPL-SCNC: 20.8 MMOL/L (ref 22–29)
CREAT SERPL-MCNC: 0.79 MG/DL (ref 0.57–1)
EGFRCR SERPLBLD CKD-EPI 2021: 100.8 ML/MIN/1.73
FOLATE SERPL-MCNC: 9.95 NG/ML (ref 4.78–24.2)
GLOBULIN UR ELPH-MCNC: 2.8 GM/DL
GLUCOSE SERPL-MCNC: 84 MG/DL (ref 65–99)
HDLC SERPL-MCNC: 38 MG/DL (ref 40–60)
IRON 24H UR-MRATE: 62 MCG/DL (ref 37–145)
IRON SATN MFR SERPL: 13 % (ref 20–50)
LDLC SERPL CALC-MCNC: 113 MG/DL (ref 0–100)
LDLC/HDLC SERPL: 2.88 {RATIO}
MAGNESIUM SERPL-MCNC: 2.2 MG/DL (ref 1.6–2.6)
POTASSIUM SERPL-SCNC: 4.5 MMOL/L (ref 3.5–5.2)
PROT SERPL-MCNC: 7.3 G/DL (ref 6–8.5)
SODIUM SERPL-SCNC: 138 MMOL/L (ref 136–145)
TIBC SERPL-MCNC: 495 MCG/DL (ref 298–536)
TRANSFERRIN SERPL-MCNC: 332 MG/DL (ref 200–360)
TRIGL SERPL-MCNC: 162 MG/DL (ref 0–150)
TSH SERPL DL<=0.05 MIU/L-ACNC: 0.71 UIU/ML (ref 0.27–4.2)
VIT B12 BLD-MCNC: 324 PG/ML (ref 211–946)
VLDLC SERPL-MCNC: 29 MG/DL (ref 5–40)

## 2024-05-14 RX ORDER — ERGOCALCIFEROL 1.25 MG/1
50000 CAPSULE ORAL WEEKLY
Qty: 12 CAPSULE | Refills: 0 | Status: SHIPPED | OUTPATIENT
Start: 2024-05-14

## 2024-06-07 RX ORDER — DOXYCYCLINE HYCLATE 100 MG/1
100 CAPSULE ORAL 2 TIMES DAILY
Qty: 10 CAPSULE | Refills: 0 | Status: SHIPPED | OUTPATIENT
Start: 2024-06-07

## 2024-07-08 RX ORDER — MEDROXYPROGESTERONE ACETATE 150 MG/ML
150 INJECTION, SUSPENSION INTRAMUSCULAR
Qty: 1 ML | Refills: 0 | Status: SHIPPED | OUTPATIENT
Start: 2024-07-08

## 2024-08-09 ENCOUNTER — OFFICE VISIT (OUTPATIENT)
Dept: INTERNAL MEDICINE | Facility: CLINIC | Age: 34
End: 2024-08-09
Payer: COMMERCIAL

## 2024-08-09 VITALS
TEMPERATURE: 98 F | BODY MASS INDEX: 41.82 KG/M2 | HEIGHT: 66 IN | DIASTOLIC BLOOD PRESSURE: 84 MMHG | HEART RATE: 94 BPM | SYSTOLIC BLOOD PRESSURE: 126 MMHG | OXYGEN SATURATION: 98 % | RESPIRATION RATE: 20 BRPM | WEIGHT: 260.2 LBS

## 2024-08-09 DIAGNOSIS — R20.0 BILATERAL NUMBNESS AND TINGLING OF ARMS AND LEGS: ICD-10-CM

## 2024-08-09 DIAGNOSIS — F41.1 GENERALIZED ANXIETY DISORDER: ICD-10-CM

## 2024-08-09 DIAGNOSIS — M25.551 BILATERAL HIP PAIN: ICD-10-CM

## 2024-08-09 DIAGNOSIS — E55.9 VITAMIN D DEFICIENCY: ICD-10-CM

## 2024-08-09 DIAGNOSIS — L70.9 ACNE, UNSPECIFIED ACNE TYPE: ICD-10-CM

## 2024-08-09 DIAGNOSIS — F33.1 MODERATE EPISODE OF RECURRENT MAJOR DEPRESSIVE DISORDER: ICD-10-CM

## 2024-08-09 DIAGNOSIS — R20.2 BILATERAL NUMBNESS AND TINGLING OF ARMS AND LEGS: ICD-10-CM

## 2024-08-09 DIAGNOSIS — M25.552 BILATERAL HIP PAIN: ICD-10-CM

## 2024-08-09 DIAGNOSIS — J45.909 MODERATE ASTHMA WITHOUT COMPLICATION, UNSPECIFIED WHETHER PERSISTENT: ICD-10-CM

## 2024-08-09 DIAGNOSIS — K21.9 GASTROESOPHAGEAL REFLUX DISEASE, UNSPECIFIED WHETHER ESOPHAGITIS PRESENT: ICD-10-CM

## 2024-08-09 DIAGNOSIS — I10 PRIMARY HYPERTENSION: Primary | ICD-10-CM

## 2024-08-09 LAB — 25(OH)D3 SERPL-MCNC: 26.8 NG/ML (ref 30–100)

## 2024-08-09 PROCEDURE — 90471 IMMUNIZATION ADMIN: CPT | Performed by: INTERNAL MEDICINE

## 2024-08-09 PROCEDURE — 99214 OFFICE O/P EST MOD 30 MIN: CPT | Performed by: INTERNAL MEDICINE

## 2024-08-09 PROCEDURE — 90677 PCV20 VACCINE IM: CPT | Performed by: INTERNAL MEDICINE

## 2024-08-09 PROCEDURE — 82306 VITAMIN D 25 HYDROXY: CPT | Performed by: INTERNAL MEDICINE

## 2024-08-09 RX ORDER — LISINOPRIL 10 MG/1
10 TABLET ORAL DAILY
Qty: 90 TABLET | Refills: 1 | Status: SHIPPED | OUTPATIENT
Start: 2024-08-09

## 2024-08-09 RX ORDER — FEXOFENADINE HCL 180 MG/1
180 TABLET ORAL DAILY
Qty: 90 TABLET | Refills: 1 | Status: SHIPPED | OUTPATIENT
Start: 2024-08-09

## 2024-08-09 RX ORDER — ERGOCALCIFEROL 1.25 MG/1
50000 CAPSULE ORAL WEEKLY
Qty: 12 CAPSULE | Refills: 0 | Status: SHIPPED | OUTPATIENT
Start: 2024-08-09

## 2024-08-09 RX ORDER — DESVENLAFAXINE 25 MG/1
25 TABLET, EXTENDED RELEASE ORAL DAILY
Qty: 90 TABLET | Refills: 1 | Status: SHIPPED | OUTPATIENT
Start: 2024-08-09

## 2024-08-09 RX ORDER — TRETINOIN 1 MG/G
1 CREAM TOPICAL NIGHTLY
Qty: 45 G | Refills: 0 | Status: SHIPPED | OUTPATIENT
Start: 2024-08-09

## 2024-08-09 RX ORDER — PANTOPRAZOLE SODIUM 20 MG/1
20 TABLET, DELAYED RELEASE ORAL DAILY
Qty: 90 TABLET | Refills: 1 | Status: SHIPPED | OUTPATIENT
Start: 2024-08-09

## 2024-08-09 NOTE — PROGRESS NOTES
"Chief Complaint  Hypertension (Follow up ), Anxiety (Follow up ), Carpal Tunnel (Would like to discuss ), and Hip Pain (Hips catching when standing )      Subjective      History of Present Illness  The patient is a 34-year-old female here for follow-up.    She reports no chest pain or breathing difficulties. Her mental health fluctuates, with some days being better than others, often resorting to sleep. She attributes her bad days to the stress of her grandfathers cancer diagnosis and her family's spare.       Her stomach medication is still effective.    She has expressed interest in [employer] testing. She has started journaling more to improve her focus but hasn't as much recently.    She has been experiencing constant hip pain for the past few months. The pain intensifies when she rises from a seated position or bends over to  objects. An MRI of her spine conducted two years ago revealed pinched nerves and bulging disks. She suspects this condition may have worsened, possibly due to her job.    She has been experiencing issues with cubital and carpal tunnel syndrome in both hands. Occasionally, her entire hand will go numb, even while smoking or holding her phone, causing her to frequently drop objects. Shaking her arms sometimes alleviates the numbness, while other times it does not.         Objective   Vital Signs:   Vitals:    08/09/24 0907   BP: 126/84   BP Location: Right arm   Patient Position: Sitting   Cuff Size: Adult   Pulse: 94   Resp: 20   Temp: 98 °F (36.7 °C)   TempSrc: Temporal   SpO2: 98%   Weight: 118 kg (260 lb 3.2 oz)   Height: 168.9 cm (66.5\")     Body mass index is 41.37 kg/m².    Wt Readings from Last 3 Encounters:   08/09/24 118 kg (260 lb 3.2 oz)   05/10/24 120 kg (263 lb 12.8 oz)   01/25/24 121 kg (266 lb 6.4 oz)     BP Readings from Last 3 Encounters:   08/09/24 126/84   05/10/24 144/90   01/25/24 132/87       Health Maintenance   Topic Date Due    Pneumococcal Vaccine 0-64 (1 of " 2 - PCV) Never done    ANNUAL PHYSICAL  04/05/2024    INFLUENZA VACCINE  08/01/2024    COVID-19 Vaccine (2 - 2023-24 season) 09/01/2024 (Originally 9/1/2023)    BMI FOLLOWUP  05/10/2025    PAP SMEAR  01/27/2026    TDAP/TD VACCINES (2 - Td or Tdap) 04/05/2033    HEPATITIS C SCREENING  Completed       Physical Exam  Vitals reviewed.   Constitutional:       Appearance: Normal appearance. She is well-developed. She is obese.   HENT:      Head: Normocephalic and atraumatic.      Right Ear: External ear normal.      Left Ear: External ear normal.   Eyes:      Conjunctiva/sclera: Conjunctivae normal.      Pupils: Pupils are equal, round, and reactive to light.   Cardiovascular:      Rate and Rhythm: Normal rate and regular rhythm.      Heart sounds: No murmur heard.     No friction rub. No gallop.   Pulmonary:      Effort: Pulmonary effort is normal.      Breath sounds: Normal breath sounds. No wheezing or rhonchi.   Skin:     General: Skin is warm and dry.   Neurological:      Mental Status: She is alert and oriented to person, place, and time.   Psychiatric:         Mood and Affect: Affect normal.         Behavior: Behavior normal.         Thought Content: Thought content normal.        Physical Exam        Result Review :  The following data was reviewed by: Amber Byrne MD on 08/09/2024:         Results              Procedures            Assessment & Plan  Primary hypertension    Gastroesophageal reflux disease, unspecified whether esophagitis present    Moderate episode of recurrent major depressive disorder    Generalized anxiety disorder    Vitamin D deficiency    Bilateral hip pain    Bilateral numbness and tingling of arms and legs    Acne, unspecified acne type    Moderate asthma without complication, unspecified whether persistent            Orders Placed This Encounter   Procedures    XR Hips Bilateral With or Without Pelvis 2 View    Pneumococcal Conjugate Vaccine 20-Valent (PCV20)    Vitamin  D,25-Hydroxy    Gene[employer]ight - Swab,    EMG & Nerve Conduction Test     New Medications Ordered This Visit   Medications    tretinoin (Retin-A) 0.1 % cream     Sig: Apply 1 Application topically to the appropriate area as directed Every Night.     Dispense:  45 g     Refill:  0    vitamin D (ERGOCALCIFEROL) 1.25 MG (92835 UT) capsule capsule     Sig: Take 1 capsule by mouth 1 (One) Time Per Week.     Dispense:  12 capsule     Refill:  0    pantoprazole (PROTONIX) 20 MG EC tablet     Sig: Take 1 tablet by mouth Daily.     Dispense:  90 tablet     Refill:  1     This prescription was filled on 7/25/2023. Any refills authorized will be placed on file.    lisinopril (PRINIVIL,ZESTRIL) 10 MG tablet     Sig: Take 1 tablet by mouth Daily.     Dispense:  90 tablet     Refill:  1     This prescription was filled on 7/25/2023. Any refills authorized will be placed on file.    fexofenadine (ALLEGRA) 180 MG tablet     Sig: Take 1 tablet by mouth Daily.     Dispense:  90 tablet     Refill:  1    Desvenlafaxine Succinate ER (Pristiq) 25 MG tablet sustained-release 24 hour     Sig: Take 1 tablet by mouth Daily.     Dispense:  90 tablet     Refill:  1          Assessment & Plan  1. Mental health.  She was advised to continue engage in conversations with her mother and to consider counseling. Prescriptions for vitamin D, Allegra, and Pristiq were renewed.    2. acne  She was advised to inquire with her pharmacy about an alternative retinoid that is less costly.    3. Weight management.  She has lost 3 pounds since her last visit and 6 pounds since 01/2024. Her job involves a desk job. Her weight was 266 pounds in 01/2024 and 263 pounds in 05/2023. She was advised to engage in physical activity, such as walking during her smoke break. Meal prepping was suggested. GeneSight testing was ordered. She was encouraged to continue journaling.    4. Hip pain.  An x-ray was ordered.    5. Cubital and carpal tunnel syndrome.  An EMG was  ordered.    Patient or patient representative verbalized consent for the use of Ambient Listening during the visit with  Amber Byrne MD for chart documentation. 8/9/2024  09:53 EDT      FOLLOW UP  Return in about 3 months (around 11/9/2024).  Patient was given instructions and counseling regarding her condition or for health maintenance advice. Please see specific information pulled into the AVS if appropriate.     Amber Byrne MD  08/09/24  10:13 EDT    CURRENT & DISCONTINUED MEDICATIONS  Current Outpatient Medications   Medication Instructions    Desvenlafaxine Succinate ER (PRISTIQ) 25 mg, Oral, Daily    fexofenadine (ALLEGRA) 180 mg, Oral, Daily    lisinopril (PRINIVIL,ZESTRIL) 10 mg, Oral, Daily    medroxyPROGESTERone (DEPO-PROVERA) 150 mg, Intramuscular, Every 3 Months    pantoprazole (PROTONIX) 20 mg, Oral, Daily    tretinoin (Retin-A) 0.1 % cream 1 Application, Topical, Nightly    vitamin D (ERGOCALCIFEROL) 50,000 Units, Oral, Weekly       Medications Discontinued During This Encounter   Medication Reason    Diclofenac Sodium (Voltaren) 1 % gel gel     doxycycline (VIBRAMYCIN) 100 MG capsule *Therapy completed    fexofenadine (ALLEGRA) 180 MG tablet Reorder    lisinopril (PRINIVIL,ZESTRIL) 10 MG tablet Reorder    pantoprazole (PROTONIX) 20 MG EC tablet Reorder    tretinoin (Retin-A) 0.1 % cream Reorder    Desvenlafaxine Succinate ER (Pristiq) 25 MG tablet sustained-release 24 hour Reorder    vitamin D (ERGOCALCIFEROL) 1.25 MG (45038 UT) capsule capsule Reorder

## 2024-08-13 ENCOUNTER — PATIENT MESSAGE (OUTPATIENT)
Dept: INTERNAL MEDICINE | Facility: CLINIC | Age: 34
End: 2024-08-13
Payer: COMMERCIAL

## 2024-08-14 NOTE — TELEPHONE ENCOUNTER
From: Carina Sanchez  To: Amber Byrne  Sent: 8/13/2024 8:45 PM EDT  Subject: Hip X-Rays    Ok, so i guess my next question would be, since the x-rays weren’t conclusive as to the origin of my pain i’ve been experiencing, what is the next step/where do we go from here?

## 2024-08-30 ENCOUNTER — PATIENT MESSAGE (OUTPATIENT)
Dept: INTERNAL MEDICINE | Facility: CLINIC | Age: 34
End: 2024-08-30
Payer: COMMERCIAL

## 2024-08-30 NOTE — TELEPHONE ENCOUNTER
From: Carina Sanchez  To: Amber Byrne  Sent: 8/30/2024 3:18 PM EDT  Subject: Vitamin D    Good afternoon, Dr. Byrne. I was just wondering why you prescribed me 1.25mg of D2 specifically, over vitamin D3? Would I benefit more from a D3+K2? Thanks!! Hope you have a good weekend!

## 2024-10-01 RX ORDER — MEDROXYPROGESTERONE ACETATE 150 MG/ML
150 INJECTION, SUSPENSION INTRAMUSCULAR
Qty: 1 ML | Refills: 0 | Status: SHIPPED | OUTPATIENT
Start: 2024-10-01 | End: 2024-10-01 | Stop reason: SDUPTHER

## 2024-10-01 RX ORDER — MEDROXYPROGESTERONE ACETATE 150 MG/ML
150 INJECTION, SUSPENSION INTRAMUSCULAR
Qty: 1 ML | Refills: 3 | Status: SHIPPED | OUTPATIENT
Start: 2024-10-01

## 2024-10-24 ENCOUNTER — PATIENT MESSAGE (OUTPATIENT)
Dept: INTERNAL MEDICINE | Facility: CLINIC | Age: 34
End: 2024-10-24
Payer: COMMERCIAL

## 2024-10-25 NOTE — TELEPHONE ENCOUNTER
Sometimes patients are not able to see what is scanned in under media.  Are you able to get this to her somehow?

## 2024-12-02 ENCOUNTER — OFFICE VISIT (OUTPATIENT)
Dept: INTERNAL MEDICINE | Facility: CLINIC | Age: 34
End: 2024-12-02
Payer: MEDICAID

## 2024-12-02 VITALS
HEART RATE: 97 BPM | WEIGHT: 268 LBS | OXYGEN SATURATION: 100 % | DIASTOLIC BLOOD PRESSURE: 82 MMHG | HEIGHT: 66 IN | BODY MASS INDEX: 43.07 KG/M2 | SYSTOLIC BLOOD PRESSURE: 128 MMHG | TEMPERATURE: 96.9 F | RESPIRATION RATE: 22 BRPM

## 2024-12-02 DIAGNOSIS — I10 PRIMARY HYPERTENSION: ICD-10-CM

## 2024-12-02 DIAGNOSIS — K62.5 RECTAL BLEEDING: ICD-10-CM

## 2024-12-02 DIAGNOSIS — F33.1 MODERATE EPISODE OF RECURRENT MAJOR DEPRESSIVE DISORDER: Primary | ICD-10-CM

## 2024-12-02 DIAGNOSIS — J32.9 SINUSITIS, UNSPECIFIED CHRONICITY, UNSPECIFIED LOCATION: ICD-10-CM

## 2024-12-02 DIAGNOSIS — J30.9 ALLERGIC RHINITIS, UNSPECIFIED SEASONALITY, UNSPECIFIED TRIGGER: ICD-10-CM

## 2024-12-02 DIAGNOSIS — L70.0 ACNE VULGARIS: ICD-10-CM

## 2024-12-02 DIAGNOSIS — K64.9 HEMORRHOIDS, UNSPECIFIED HEMORRHOID TYPE: ICD-10-CM

## 2024-12-02 PROCEDURE — 99214 OFFICE O/P EST MOD 30 MIN: CPT | Performed by: INTERNAL MEDICINE

## 2024-12-02 RX ORDER — HYDROCORTISONE ACETATE PRAMOXINE HCL 2.5; 1 G/100G; G/100G
CREAM TOPICAL 3 TIMES DAILY
Qty: 30 G | Refills: 1 | Status: SHIPPED | OUTPATIENT
Start: 2024-12-02

## 2024-12-02 RX ORDER — TRETINOIN 1 MG/G
1 CREAM TOPICAL NIGHTLY
Qty: 45 G | Refills: 0 | Status: SHIPPED | OUTPATIENT
Start: 2024-12-02

## 2024-12-02 RX ORDER — CLINDAMYCIN HYDROCHLORIDE 300 MG/1
300 CAPSULE ORAL 3 TIMES DAILY
Qty: 21 CAPSULE | Refills: 0 | Status: SHIPPED | OUTPATIENT
Start: 2024-12-02 | End: 2024-12-09

## 2024-12-02 RX ORDER — PANTOPRAZOLE SODIUM 20 MG/1
20 TABLET, DELAYED RELEASE ORAL DAILY
Qty: 90 TABLET | Refills: 1 | Status: SHIPPED | OUTPATIENT
Start: 2024-12-02

## 2024-12-02 RX ORDER — DESVENLAFAXINE 25 MG/1
25 TABLET, EXTENDED RELEASE ORAL DAILY
Qty: 90 TABLET | Refills: 1 | Status: SHIPPED | OUTPATIENT
Start: 2024-12-02

## 2024-12-02 RX ORDER — MEDROXYPROGESTERONE ACETATE 150 MG/ML
150 INJECTION, SUSPENSION INTRAMUSCULAR
Qty: 1 ML | Refills: 3 | Status: SHIPPED | OUTPATIENT
Start: 2024-12-02

## 2024-12-02 RX ORDER — ERGOCALCIFEROL 1.25 MG/1
50000 CAPSULE, LIQUID FILLED ORAL WEEKLY
Qty: 12 CAPSULE | Refills: 0 | Status: SHIPPED | OUTPATIENT
Start: 2024-12-02

## 2024-12-02 RX ORDER — LISINOPRIL 10 MG/1
10 TABLET ORAL DAILY
Qty: 90 TABLET | Refills: 1 | Status: SHIPPED | OUTPATIENT
Start: 2024-12-02

## 2024-12-02 NOTE — PROGRESS NOTES
"Chief Complaint  Hypertension, Anxiety (3 mo f/u /), Vitamin D Deficiency, and Hemorrhoids (Significant bleeding )      Subjective      History of Present Illness  The patient presents for evaluation of multiple medical concerns.    Weight Gain and Mood Changes  She reports a weight gain of 8 pounds, which has affected her mood. She has not been taking her prescribed Pristiq due to a lack of insurance.     She has a supply of lisinopril at home and is also taking Protonix, which she finds effective. She suspects she may have a hiatal hernia due to persistent heartburn when bending over.    Skin Breakouts  She has a supply of Tazorac at home and experiences skin breakouts. She has been using a compound of salicylic acid, boric acid, a steroid, and Vaseline, but reports a burning sensation upon application.    Hemorrhoids and Blood in Stool  She has a history of hemorrhoids and has noticed significant blood in her stool, which is irregular in shape and consistency. She has been experiencing discomfort and pain during bowel movements for the past week. She has a history of Irritable Bowel Syndrome (IBS) and has undergone a colonoscopy and endoscopy in the past.    She has been sick for awhile and now has developed yellow green congestion             Objective   Vital Signs:   Vitals:    12/02/24 0821   BP: 128/82   BP Location: Left arm   Patient Position: Sitting   Cuff Size: Adult   Pulse: 97   Resp: 22   Temp: 96.9 °F (36.1 °C)   TempSrc: Temporal   SpO2: 100%   Weight: 122 kg (268 lb)   Height: 168.9 cm (66.5\")     Body mass index is 42.61 kg/m².    Wt Readings from Last 3 Encounters:   12/02/24 122 kg (268 lb)   08/09/24 118 kg (260 lb 3.2 oz)   05/10/24 120 kg (263 lb 12.8 oz)     BP Readings from Last 3 Encounters:   12/02/24 128/82   08/09/24 126/84   05/10/24 144/90       Health Maintenance   Topic Date Due    ANNUAL PHYSICAL  04/05/2024    COVID-19 Vaccine (2 - 2024-25 season) 12/04/2024 (Originally 9/1/2024) "    INFLUENZA VACCINE  12/09/2024 (Originally 7/1/2024)    BMI FOLLOWUP  05/10/2025    PAP SMEAR  01/27/2026    TDAP/TD VACCINES (2 - Td or Tdap) 04/05/2033    HEPATITIS C SCREENING  Completed    Pneumococcal Vaccine 0-64  Completed       Physical Exam   Physical Exam  General Appearance: Normal.  Vital signs: Within normal limits.  HEENT: Fluid noted in both ears.  Respiratory: Lungs are clear to auscultation bilaterally.  Cardiovascular: Heart has a regular rate and rhythm with no murmurs, rubs, or gallops.  Extremities: No swelling in legs.  Skin: Warm and dry, no rash.  Neurological: Normal.      Result Review :  The following data was reviewed by: Amber Byrne MD on 12/02/2024:         Results              Procedures            Assessment & Plan  Moderate episode of recurrent major depressive disorder           Primary hypertension           Acne vulgaris         Hemorrhoids, unspecified hemorrhoid type         Rectal bleeding    Orders:    Ambulatory Referral to Gastroenterology    Allergic rhinitis, unspecified seasonality, unspecified trigger         Sinusitis, unspecified chronicity, unspecified location              Assessment & Plan  1. Weight gain - Reports an 8-pound weight gain despite no changes in diet or activity level.  - Advised to monitor weight and consider lifestyle modifications if necessary.    2. Depression - Has not taken Pristiq due to loss of insurance.  - Refill for Pristiq sent to pharmacy.  - Advised to check if Pristiq can be obtained cheaply through [employer] or similar programs.  - If unaffordable, contact the office for an alternative.    3. Hypertension - Blood pressure is stable.  - Has been taking lisinopril and has plenty at home.  - Refill for lisinopril sent to pharmacy.    4. Gastroesophageal Reflux Disease (GERD) - Experiencing symptoms suggestive of a hiatal hernia.  - Advised to continue taking Protonix.  - GI scope ordered, likely scheduled 3 to 4 months down the  road.    5. Hemorrhoids - Reports significant rectal bleeding and discomfort.  - Rectal steroid recommended.  - If symptoms persist, consultation with a colorectal surgeon for possible banding will be necessary.    6. Sinusitis - Has fluid in both ears and experiencing sinus symptoms.  - Clindamycin prescribed for 7 days.  - Informed that medication may cause looser and orange-red stools.    7. Acne - Advised to use adapalene gel every third or fourth day and gradually increase to daily use as tolerated.  - Refill for tretinoin cream sent to pharmacy.    8. Vitamin D deficiency - Refill for vitamin D sent to pharmacy.    Follow-up  - Return in 3 months for follow-up.    Patient or patient representative verbalized consent for the use of Ambient Listening during the visit with  Amber Byrne MD for chart documentation. 12/2/2024  08:47 EST      FOLLOW UP  Return in about 3 months (around 3/2/2025).  Patient was given instructions and counseling regarding her condition or for health maintenance advice. Please see specific information pulled into the AVS if appropriate.     Amber Byrne MD  12/02/24  09:05 EST    CURRENT & DISCONTINUED MEDICATIONS  Current Outpatient Medications   Medication Instructions    clindamycin (CLEOCIN) 300 mg, Oral, 3 Times Daily    Desvenlafaxine Succinate ER (PRISTIQ) 25 mg, Oral, Daily    fexofenadine (ALLEGRA) 180 mg, Oral, Daily    Hydrocort-Pramoxine, Perianal, (ANALPRAM-HC) 2.5-1 % rectal cream Rectal, 3 Times Daily    lisinopril (PRINIVIL,ZESTRIL) 10 mg, Oral, Daily    medroxyPROGESTERone (DEPO-PROVERA) 150 mg, Intramuscular, Every 3 Months    pantoprazole (PROTONIX) 20 mg, Oral, Daily    tretinoin (Retin-A) 0.1 % cream 1 Application, Topical, Nightly    vitamin D (ERGOCALCIFEROL) 50,000 Units, Oral, Weekly       Medications Discontinued During This Encounter   Medication Reason    tretinoin (Retin-A) 0.1 % cream Reorder    vitamin D (ERGOCALCIFEROL) 1.25 MG (16572 UT)  capsule capsule Reorder    pantoprazole (PROTONIX) 20 MG EC tablet Reorder    lisinopril (PRINIVIL,ZESTRIL) 10 MG tablet Reorder    Desvenlafaxine Succinate ER (Pristiq) 25 MG tablet sustained-release 24 hour Reorder    medroxyPROGESTERone (DEPO-PROVERA) 150 MG/ML injection Reorder

## 2025-05-30 ENCOUNTER — PATIENT MESSAGE (OUTPATIENT)
Dept: INTERNAL MEDICINE | Facility: CLINIC | Age: 35
End: 2025-05-30

## 2025-06-17 ENCOUNTER — TELEPHONE (OUTPATIENT)
Dept: INTERNAL MEDICINE | Facility: CLINIC | Age: 35
End: 2025-06-17
Payer: COMMERCIAL

## 2025-06-17 NOTE — TELEPHONE ENCOUNTER
Caller: Laura Carina Raye    Relationship: Self    Best call back number: [phone]     What is the best time to reach you: ANY     Who are you requesting to speak with (clinical staff, provider,  specific staff member): CLINICAL     What was the call regarding: CALLER STATED THAT SHE WAS SEEN AT [facility] 6/16/25. CALLER STATED THAT SHE ATTEMPTED TO BE SEEN IN THE OFFICE AND THERE WAS NO AVAILABILITY. SHE WAS OUT OF WORK FOR COVID FROM 6/13/25 TO TENTATIVELY 6/18/25 AND IS NEEDING A RETURN TO WORK RELEASE AS WELL AS TO KNOW IF DR DIETZ WILL COMPLETE [facility] PAPERWORK, SINCE URGENT CARE WILL NOT PROVIDE THOSE.

## 2025-06-17 NOTE — TELEPHONE ENCOUNTER
Spoke with patient and inform her pcp will do her FMLA paperwork, pt stated she will drop it off today.